# Patient Record
Sex: MALE | Race: ASIAN | NOT HISPANIC OR LATINO | URBAN - METROPOLITAN AREA
[De-identification: names, ages, dates, MRNs, and addresses within clinical notes are randomized per-mention and may not be internally consistent; named-entity substitution may affect disease eponyms.]

---

## 2020-06-24 ENCOUNTER — OUTPATIENT (OUTPATIENT)
Dept: OUTPATIENT SERVICES | Facility: HOSPITAL | Age: 45
LOS: 1 days | End: 2020-06-24

## 2021-03-16 ENCOUNTER — OUTPATIENT (OUTPATIENT)
Dept: OUTPATIENT SERVICES | Facility: HOSPITAL | Age: 46
LOS: 1 days | End: 2021-03-16

## 2021-06-28 ENCOUNTER — TRANSCRIPTION ENCOUNTER (OUTPATIENT)
Age: 46
End: 2021-06-28

## 2022-02-07 ENCOUNTER — TRANSCRIPTION ENCOUNTER (OUTPATIENT)
Age: 47
End: 2022-02-07

## 2022-02-28 ENCOUNTER — TRANSCRIPTION ENCOUNTER (OUTPATIENT)
Age: 47
End: 2022-02-28

## 2022-03-03 ENCOUNTER — EMERGENCY (EMERGENCY)
Facility: HOSPITAL | Age: 47
LOS: 1 days | Discharge: HOME | End: 2022-03-03
Admitting: EMERGENCY MEDICINE
Payer: COMMERCIAL

## 2022-03-03 PROCEDURE — 71045 X-RAY EXAM CHEST 1 VIEW: CPT | Mod: 26

## 2022-03-03 PROCEDURE — 99285 EMERGENCY DEPT VISIT HI MDM: CPT

## 2022-03-03 PROCEDURE — 76705 ECHO EXAM OF ABDOMEN: CPT | Mod: 26

## 2022-03-04 ENCOUNTER — INPATIENT (INPATIENT)
Facility: HOSPITAL | Age: 47
LOS: 3 days | Discharge: HOME | DRG: 871 | End: 2022-03-08
Attending: SURGERY | Admitting: SURGERY
Payer: COMMERCIAL

## 2022-03-04 VITALS
DIASTOLIC BLOOD PRESSURE: 60 MMHG | HEIGHT: 65 IN | TEMPERATURE: 100 F | RESPIRATION RATE: 18 BRPM | WEIGHT: 235.01 LBS | OXYGEN SATURATION: 97 % | HEART RATE: 137 BPM | SYSTOLIC BLOOD PRESSURE: 85 MMHG

## 2022-03-04 DIAGNOSIS — M54.9 DORSALGIA, UNSPECIFIED: ICD-10-CM

## 2022-03-04 DIAGNOSIS — Z90.49 ACQUIRED ABSENCE OF OTHER SPECIFIED PARTS OF DIGESTIVE TRACT: Chronic | ICD-10-CM

## 2022-03-04 DIAGNOSIS — I10 ESSENTIAL (PRIMARY) HYPERTENSION: ICD-10-CM

## 2022-03-04 DIAGNOSIS — K80.20 CALCULUS OF GALLBLADDER WITHOUT CHOLECYSTITIS WITHOUT OBSTRUCTION: ICD-10-CM

## 2022-03-04 DIAGNOSIS — K82.A2 PERFORATION OF GALLBLADDER IN CHOLECYSTITIS: ICD-10-CM

## 2022-03-04 DIAGNOSIS — R10.11 RIGHT UPPER QUADRANT PAIN: ICD-10-CM

## 2022-03-04 DIAGNOSIS — E11.9 TYPE 2 DIABETES MELLITUS WITHOUT COMPLICATIONS: ICD-10-CM

## 2022-03-04 DIAGNOSIS — Z90.49 ACQUIRED ABSENCE OF OTHER SPECIFIED PARTS OF DIGESTIVE TRACT: ICD-10-CM

## 2022-03-04 LAB
ALBUMIN SERPL ELPH-MCNC: 3.4 G/DL — SIGNIFICANT CHANGE UP (ref 3.3–5)
ALBUMIN SERPL ELPH-MCNC: 4.1 G/DL — SIGNIFICANT CHANGE UP (ref 3.3–5)
ALP SERPL-CCNC: 100 U/L — SIGNIFICANT CHANGE UP (ref 40–120)
ALP SERPL-CCNC: 97 U/L — SIGNIFICANT CHANGE UP (ref 40–120)
ALT FLD-CCNC: 44 U/L — SIGNIFICANT CHANGE UP (ref 10–45)
ALT FLD-CCNC: 46 U/L — HIGH (ref 10–45)
ANION GAP SERPL CALC-SCNC: 17 MMOL/L — SIGNIFICANT CHANGE UP (ref 5–17)
ANION GAP SERPL CALC-SCNC: 18 MMOL/L — HIGH (ref 5–17)
APTT BLD: 29.6 SEC — SIGNIFICANT CHANGE UP (ref 27.5–35.5)
APTT BLD: 32.4 SEC — SIGNIFICANT CHANGE UP (ref 27.5–35.5)
AST SERPL-CCNC: 44 U/L — HIGH (ref 10–40)
AST SERPL-CCNC: 56 U/L — HIGH (ref 10–40)
BASE EXCESS BLDV CALC-SCNC: 0.6 MMOL/L — SIGNIFICANT CHANGE UP (ref -2–2)
BASE EXCESS BLDV CALC-SCNC: 1.5 MMOL/L — SIGNIFICANT CHANGE UP (ref -2–2)
BASOPHILS # BLD AUTO: 0.01 K/UL — SIGNIFICANT CHANGE UP (ref 0–0.2)
BASOPHILS NFR BLD AUTO: 0.1 % — SIGNIFICANT CHANGE UP (ref 0–2)
BILIRUB DIRECT SERPL-MCNC: 1.4 MG/DL — HIGH (ref 0–0.3)
BILIRUB INDIRECT FLD-MCNC: 0.6 MG/DL — SIGNIFICANT CHANGE UP (ref 0.2–1)
BILIRUB SERPL-MCNC: 1.4 MG/DL — HIGH (ref 0.2–1.2)
BILIRUB SERPL-MCNC: 2 MG/DL — HIGH (ref 0.2–1.2)
BLD GP AB SCN SERPL QL: NEGATIVE — SIGNIFICANT CHANGE UP
BLOOD GAS VENOUS - CREATININE: SIGNIFICANT CHANGE UP MG/DL (ref 0.5–1.3)
BLOOD GAS VENOUS - CREATININE: SIGNIFICANT CHANGE UP MG/DL (ref 0.5–1.3)
BUN SERPL-MCNC: 18 MG/DL — SIGNIFICANT CHANGE UP (ref 7–23)
BUN SERPL-MCNC: 19 MG/DL — SIGNIFICANT CHANGE UP (ref 7–23)
CA-I SERPL-SCNC: 1.2 MMOL/L — SIGNIFICANT CHANGE UP (ref 1.15–1.33)
CA-I SERPL-SCNC: 1.24 MMOL/L — SIGNIFICANT CHANGE UP (ref 1.15–1.33)
CALCIUM SERPL-MCNC: 10.2 MG/DL — SIGNIFICANT CHANGE UP (ref 8.4–10.5)
CALCIUM SERPL-MCNC: 9.4 MG/DL — SIGNIFICANT CHANGE UP (ref 8.4–10.5)
CHLORIDE BLDV-SCNC: 97 MMOL/L — SIGNIFICANT CHANGE UP (ref 96–108)
CHLORIDE BLDV-SCNC: 97 MMOL/L — SIGNIFICANT CHANGE UP (ref 96–108)
CHLORIDE SERPL-SCNC: 95 MMOL/L — LOW (ref 96–108)
CHLORIDE SERPL-SCNC: 96 MMOL/L — SIGNIFICANT CHANGE UP (ref 96–108)
CO2 BLDV-SCNC: 27 MMOL/L — HIGH (ref 22–26)
CO2 BLDV-SCNC: 27 MMOL/L — HIGH (ref 22–26)
CO2 SERPL-SCNC: 21 MMOL/L — LOW (ref 22–31)
CO2 SERPL-SCNC: 22 MMOL/L — SIGNIFICANT CHANGE UP (ref 22–31)
CREAT SERPL-MCNC: 1.47 MG/DL — HIGH (ref 0.5–1.3)
CREAT SERPL-MCNC: 1.52 MG/DL — HIGH (ref 0.5–1.3)
EGFR: 56 ML/MIN/1.73M2 — LOW
EGFR: 58 ML/MIN/1.73M2 — LOW
EOSINOPHIL # BLD AUTO: 0.03 K/UL — SIGNIFICANT CHANGE UP (ref 0–0.5)
EOSINOPHIL NFR BLD AUTO: 0.3 % — SIGNIFICANT CHANGE UP (ref 0–6)
FLUAV AG NPH QL: SIGNIFICANT CHANGE UP
FLUBV AG NPH QL: SIGNIFICANT CHANGE UP
GAS PNL BLDV: 133 MMOL/L — LOW (ref 136–145)
GAS PNL BLDV: 134 MMOL/L — LOW (ref 136–145)
GAS PNL BLDV: SIGNIFICANT CHANGE UP
GLUCOSE BLDV-MCNC: 166 MG/DL — HIGH (ref 70–99)
GLUCOSE BLDV-MCNC: 99 MG/DL — SIGNIFICANT CHANGE UP (ref 70–99)
GLUCOSE SERPL-MCNC: 102 MG/DL — HIGH (ref 70–99)
GLUCOSE SERPL-MCNC: 158 MG/DL — HIGH (ref 70–99)
HCO3 BLDV-SCNC: 26 MMOL/L — SIGNIFICANT CHANGE UP (ref 22–29)
HCO3 BLDV-SCNC: 26 MMOL/L — SIGNIFICANT CHANGE UP (ref 22–29)
HCT VFR BLD CALC: 32.6 % — LOW (ref 39–50)
HCT VFR BLD CALC: 35.5 % — LOW (ref 39–50)
HCT VFR BLDA CALC: 34 % — LOW (ref 39–51)
HCT VFR BLDA CALC: 38 % — LOW (ref 39–51)
HGB BLD CALC-MCNC: 11.4 G/DL — LOW (ref 12.6–17.4)
HGB BLD CALC-MCNC: 12.5 G/DL — LOW (ref 12.6–17.4)
HGB BLD-MCNC: 10.9 G/DL — LOW (ref 13–17)
HGB BLD-MCNC: 11.9 G/DL — LOW (ref 13–17)
HOROWITZ INDEX BLDV+IHG-RTO: 21 — SIGNIFICANT CHANGE UP
IMM GRANULOCYTES NFR BLD AUTO: 0.5 % — SIGNIFICANT CHANGE UP (ref 0–1.5)
INR BLD: 1.41 RATIO — HIGH (ref 0.88–1.16)
INR BLD: 1.46 RATIO — HIGH (ref 0.88–1.16)
LACTATE BLDV-MCNC: 2.2 MMOL/L — HIGH (ref 0.7–2)
LACTATE BLDV-MCNC: 2.6 MMOL/L — HIGH (ref 0.7–2)
LACTATE SERPL-SCNC: 1.6 MMOL/L — SIGNIFICANT CHANGE UP (ref 0.7–2)
LYMPHOCYTES # BLD AUTO: 0.59 K/UL — LOW (ref 1–3.3)
LYMPHOCYTES # BLD AUTO: 5.9 % — LOW (ref 13–44)
MAGNESIUM SERPL-MCNC: 1.7 MG/DL — SIGNIFICANT CHANGE UP (ref 1.6–2.6)
MCHC RBC-ENTMCNC: 28.9 PG — SIGNIFICANT CHANGE UP (ref 27–34)
MCHC RBC-ENTMCNC: 29.2 PG — SIGNIFICANT CHANGE UP (ref 27–34)
MCHC RBC-ENTMCNC: 33.4 GM/DL — SIGNIFICANT CHANGE UP (ref 32–36)
MCHC RBC-ENTMCNC: 33.5 GM/DL — SIGNIFICANT CHANGE UP (ref 32–36)
MCV RBC AUTO: 86.5 FL — SIGNIFICANT CHANGE UP (ref 80–100)
MCV RBC AUTO: 87.2 FL — SIGNIFICANT CHANGE UP (ref 80–100)
MONOCYTES # BLD AUTO: 0.32 K/UL — SIGNIFICANT CHANGE UP (ref 0–0.9)
MONOCYTES NFR BLD AUTO: 3.2 % — SIGNIFICANT CHANGE UP (ref 2–14)
NEUTROPHILS # BLD AUTO: 9.05 K/UL — HIGH (ref 1.8–7.4)
NEUTROPHILS NFR BLD AUTO: 90 % — HIGH (ref 43–77)
NRBC # BLD: 0 /100 WBCS — SIGNIFICANT CHANGE UP (ref 0–0)
NRBC # BLD: 0 /100 WBCS — SIGNIFICANT CHANGE UP (ref 0–0)
PCO2 BLDV: 39 MMHG — LOW (ref 42–55)
PCO2 BLDV: 44 MMHG — SIGNIFICANT CHANGE UP (ref 42–55)
PH BLDV: 7.38 — SIGNIFICANT CHANGE UP (ref 7.32–7.43)
PH BLDV: 7.43 — SIGNIFICANT CHANGE UP (ref 7.32–7.43)
PHOSPHATE SERPL-MCNC: 2.6 MG/DL — SIGNIFICANT CHANGE UP (ref 2.5–4.5)
PLATELET # BLD AUTO: 241 K/UL — SIGNIFICANT CHANGE UP (ref 150–400)
PLATELET # BLD AUTO: 291 K/UL — SIGNIFICANT CHANGE UP (ref 150–400)
PO2 BLDV: 25 MMHG — SIGNIFICANT CHANGE UP (ref 25–45)
PO2 BLDV: 28 MMHG — SIGNIFICANT CHANGE UP (ref 25–45)
POTASSIUM BLDV-SCNC: 3.1 MMOL/L — LOW (ref 3.5–5.1)
POTASSIUM BLDV-SCNC: 3.3 MMOL/L — LOW (ref 3.5–5.1)
POTASSIUM SERPL-MCNC: 3.2 MMOL/L — LOW (ref 3.5–5.3)
POTASSIUM SERPL-MCNC: 3.5 MMOL/L — SIGNIFICANT CHANGE UP (ref 3.5–5.3)
POTASSIUM SERPL-SCNC: 3.2 MMOL/L — LOW (ref 3.5–5.3)
POTASSIUM SERPL-SCNC: 3.5 MMOL/L — SIGNIFICANT CHANGE UP (ref 3.5–5.3)
PROCALCITONIN SERPL-MCNC: 2.61 NG/ML — HIGH (ref 0.02–0.1)
PROT SERPL-MCNC: 7.2 G/DL — SIGNIFICANT CHANGE UP (ref 6–8.3)
PROT SERPL-MCNC: 8.4 G/DL — HIGH (ref 6–8.3)
PROTHROM AB SERPL-ACNC: 16.3 SEC — HIGH (ref 10.5–13.4)
PROTHROM AB SERPL-ACNC: 16.9 SEC — HIGH (ref 10.5–13.4)
RBC # BLD: 3.77 M/UL — LOW (ref 4.2–5.8)
RBC # BLD: 4.07 M/UL — LOW (ref 4.2–5.8)
RBC # FLD: 12.9 % — SIGNIFICANT CHANGE UP (ref 10.3–14.5)
RBC # FLD: 13 % — SIGNIFICANT CHANGE UP (ref 10.3–14.5)
RH IG SCN BLD-IMP: POSITIVE — SIGNIFICANT CHANGE UP
RSV RNA NPH QL NAA+NON-PROBE: SIGNIFICANT CHANGE UP
SAO2 % BLDV: 40.7 % — LOW (ref 67–88)
SAO2 % BLDV: 47.4 % — LOW (ref 67–88)
SARS-COV-2 RNA SPEC QL NAA+PROBE: SIGNIFICANT CHANGE UP
SODIUM SERPL-SCNC: 134 MMOL/L — LOW (ref 135–145)
SODIUM SERPL-SCNC: 135 MMOL/L — SIGNIFICANT CHANGE UP (ref 135–145)
WBC # BLD: 10.05 K/UL — SIGNIFICANT CHANGE UP (ref 3.8–10.5)
WBC # BLD: 2.26 K/UL — LOW (ref 3.8–10.5)
WBC # FLD AUTO: 10.05 K/UL — SIGNIFICANT CHANGE UP (ref 3.8–10.5)
WBC # FLD AUTO: 2.26 K/UL — LOW (ref 3.8–10.5)

## 2022-03-04 PROCEDURE — 99291 CRITICAL CARE FIRST HOUR: CPT

## 2022-03-04 PROCEDURE — 99292 CRITICAL CARE ADDL 30 MIN: CPT | Mod: 25

## 2022-03-04 PROCEDURE — 74177 CT ABD & PELVIS W/CONTRAST: CPT | Mod: 26

## 2022-03-04 PROCEDURE — 93010 ELECTROCARDIOGRAM REPORT: CPT

## 2022-03-04 PROCEDURE — 99222 1ST HOSP IP/OBS MODERATE 55: CPT

## 2022-03-04 PROCEDURE — 76705 ECHO EXAM OF ABDOMEN: CPT | Mod: 26,RT

## 2022-03-04 PROCEDURE — 99291 CRITICAL CARE FIRST HOUR: CPT | Mod: 25

## 2022-03-04 PROCEDURE — 71045 X-RAY EXAM CHEST 1 VIEW: CPT | Mod: 26

## 2022-03-04 RX ORDER — PHENYLEPHRINE HYDROCHLORIDE 10 MG/ML
0.5 INJECTION INTRAVENOUS
Qty: 40 | Refills: 0 | Status: DISCONTINUED | OUTPATIENT
Start: 2022-03-04 | End: 2022-03-05

## 2022-03-04 RX ORDER — PIPERACILLIN AND TAZOBACTAM 4; .5 G/20ML; G/20ML
3.38 INJECTION, POWDER, LYOPHILIZED, FOR SOLUTION INTRAVENOUS ONCE
Refills: 0 | Status: COMPLETED | OUTPATIENT
Start: 2022-03-04 | End: 2022-03-04

## 2022-03-04 RX ORDER — CHLORHEXIDINE GLUCONATE 213 G/1000ML
1 SOLUTION TOPICAL
Refills: 0 | Status: DISCONTINUED | OUTPATIENT
Start: 2022-03-04 | End: 2022-03-07

## 2022-03-04 RX ORDER — ACETAMINOPHEN 500 MG
1000 TABLET ORAL ONCE
Refills: 0 | Status: COMPLETED | OUTPATIENT
Start: 2022-03-04 | End: 2022-03-04

## 2022-03-04 RX ORDER — SODIUM CHLORIDE 9 MG/ML
3300 INJECTION, SOLUTION INTRAVENOUS ONCE
Refills: 0 | Status: COMPLETED | OUTPATIENT
Start: 2022-03-04 | End: 2022-03-04

## 2022-03-04 RX ORDER — INSULIN LISPRO 100/ML
VIAL (ML) SUBCUTANEOUS EVERY 6 HOURS
Refills: 0 | Status: DISCONTINUED | OUTPATIENT
Start: 2022-03-04 | End: 2022-03-05

## 2022-03-04 RX ORDER — SODIUM CHLORIDE 9 MG/ML
1000 INJECTION, SOLUTION INTRAVENOUS
Refills: 0 | Status: DISCONTINUED | OUTPATIENT
Start: 2022-03-04 | End: 2022-03-04

## 2022-03-04 RX ORDER — MAGNESIUM SULFATE 500 MG/ML
2 VIAL (ML) INJECTION ONCE
Refills: 0 | Status: COMPLETED | OUTPATIENT
Start: 2022-03-04 | End: 2022-03-04

## 2022-03-04 RX ORDER — SODIUM CHLORIDE 9 MG/ML
1000 INJECTION, SOLUTION INTRAVENOUS ONCE
Refills: 0 | Status: COMPLETED | OUTPATIENT
Start: 2022-03-04 | End: 2022-03-04

## 2022-03-04 RX ORDER — SODIUM CHLORIDE 9 MG/ML
1000 INJECTION, SOLUTION INTRAVENOUS
Refills: 0 | Status: DISCONTINUED | OUTPATIENT
Start: 2022-03-04 | End: 2022-03-06

## 2022-03-04 RX ORDER — ERTAPENEM SODIUM 1 G/1
1000 INJECTION, POWDER, LYOPHILIZED, FOR SOLUTION INTRAMUSCULAR; INTRAVENOUS ONCE
Refills: 0 | Status: COMPLETED | OUTPATIENT
Start: 2022-03-04 | End: 2022-03-04

## 2022-03-04 RX ORDER — POTASSIUM CHLORIDE 20 MEQ
40 PACKET (EA) ORAL ONCE
Refills: 0 | Status: COMPLETED | OUTPATIENT
Start: 2022-03-04 | End: 2022-03-04

## 2022-03-04 RX ORDER — PIPERACILLIN AND TAZOBACTAM 4; .5 G/20ML; G/20ML
3.38 INJECTION, POWDER, LYOPHILIZED, FOR SOLUTION INTRAVENOUS EVERY 8 HOURS
Refills: 0 | Status: DISCONTINUED | OUTPATIENT
Start: 2022-03-04 | End: 2022-03-05

## 2022-03-04 RX ADMIN — SODIUM CHLORIDE 1000 MILLILITER(S): 9 INJECTION, SOLUTION INTRAVENOUS at 21:30

## 2022-03-04 RX ADMIN — Medication 400 MILLIGRAM(S): at 18:56

## 2022-03-04 RX ADMIN — SODIUM CHLORIDE 3300 MILLILITER(S): 9 INJECTION, SOLUTION INTRAVENOUS at 15:39

## 2022-03-04 RX ADMIN — Medication 50 GRAM(S): at 17:24

## 2022-03-04 RX ADMIN — ERTAPENEM SODIUM 120 MILLIGRAM(S): 1 INJECTION, POWDER, LYOPHILIZED, FOR SOLUTION INTRAMUSCULAR; INTRAVENOUS at 19:07

## 2022-03-04 RX ADMIN — PIPERACILLIN AND TAZOBACTAM 200 GRAM(S): 4; .5 INJECTION, POWDER, LYOPHILIZED, FOR SOLUTION INTRAVENOUS at 15:38

## 2022-03-04 NOTE — ED PROVIDER NOTE - OBJECTIVE STATEMENT
47M w pmhx of DM, HTN, HLD presenting with CC of abdominal pain this morning. Patient had the same pain yesterday - evaluated at [facility] ER yesterday - US + for cholelithiasis and gallbladder sludge but no evidence of acute randy at that time. Pain was worse this morning accompanied by fever of 102F and diaphoresis. No n/v/d. +yellow/green soft stools. Still passing gas, past surgeries: appendectomy.

## 2022-03-04 NOTE — H&P ADULT - HISTORY OF PRESENT ILLNESS
47M w pmhx of DM, HTN, HLD, gout, sleep apnea (CPAP at night)  presenting with worsening RUQ. Pt states pain started 1 week ago, however at the time was mild and with no associated symptoms. He states that yesterday pain suddenly worsened and he noted greasy stools. He presented to [facility] ER yesterday - US + for cholelithiasis and gallbladder sludge but no evidence of acute randy at that time. Pt was discharged home, however this morning pain worsened as was associated fever of 104F and diaphoresis. He denies cp, sob, n/v, change in bowel habits, urinary symptoms, recent illness or sick contacts. He has never had this pain before. Surgical hx significant for open appendectomy.    In ED pt with tmax 100.3, SBP 80s to low 90s and tachycardia up to 130, resolved after 1L IVF. His labs are significant for no evidence of leukocytosis, ANKUSH (Cr 1.4) and lactate of 2.6. Additionally, T.bili elevated to 1.8 with mild transaminitis. A RUQ US was suggestive of perforated gallbladder with GBD dilated to 9mm.

## 2022-03-04 NOTE — ED PROVIDER NOTE - NS ED ROS FT
CONST: + fevers, no chills, no trauma  EYES: no pain, no blurry vision   ENT: no sore throat, no epistaxis, no rhinorrhea  CV: no chest pain, no palpitations, no orthopnea, no extremity pain or swelling  RESP: no shortness of breath, no cough, no sputum, no pleurisy, no wheezing  ABD: + abdominal pain, no nausea, no vomiting, no diarrhea, no black or bloody stool  : no dysuria, no hematuria, no frequency, no urgency  MSK: no back pain, no neck pain, no extremity pain  NEURO: no headache, no sensory disturbances, no focal weakness, no dizziness  HEME: no easy bleeding or bruising  SKIN: + diaphoresis, no rash

## 2022-03-04 NOTE — H&P ADULT - NSHPPHYSICALEXAM_GEN_ALL_CORE
PHYSICAL EXAM:  GENERAL: NAD  HEAD:  Atraumatic, Normocephalic  EYES: EOMI, PERRLA, conjunctiva and sclera clear  NECK: Supple  CHEST/LUNG:  Unlabored respirations  HEART: Regular rate and rhythm  ABDOMEN: Soft, Nontender, Nondistended. No rebound or guarding.   EXTREMITIES:  2+ Peripheral Pulses  NERVOUS SYSTEM:  Alert & Oriented X3

## 2022-03-04 NOTE — ED PROVIDER NOTE - PHYSICAL EXAMINATION
Const: Well-nourished, Well-developed, appearing stated age.  Eyes: no conjunctival injection, and symmetrical lids.  HEENT: Head NCAT, no lesions. Atraumatic external nose and ears.   Neck: Symmetric, trachea midline.   CVS: +S1/S2, Radial and DP pulses 2+ b/l.   RESP: Unlabored respiratory effort. Clear to auscultation bilaterally.  GI: Nontender/Nondistended, No CVA tenderness b/l.   MSK: Normocephalic/Atraumatic, Lower Extremities w/o edema b/l.   Skin: Warm, dry and intact.   Neuro: Fluent Speech. Motor & Sensation grossly intact.  Psych: Awake, Alert, & Oriented (AAO) x3. Appropriate mood and affect.

## 2022-03-04 NOTE — H&P ADULT - ATTENDING COMMENTS
ATTENDING ATTESTATION  I have seen and examined this patient with the resident housestaff. I have reviewed all labs, imaging and reports. I have participated in formulating the plan, and have read and agree with the history, ROS, exam, assessment and plan as stated above.     Dx: septic shock from acute perforated appendicitis.   SICU admission for resuscitation.   CT shows perforation into the GB, no free fluid or intra-abdominal collection.   Plan for abx, IR consultation for perc randy.     Total time spent in the care of this patient today (excluding critical care & procedures): 55 min                Over 50% of the total time was spent on counseling and coordination of care.     Radha Anderson M.D., M.S.  Division of Acute Care Surgery

## 2022-03-04 NOTE — CONSULT NOTE ADULT - SUBJECTIVE AND OBJECTIVE BOX
Vascular & Interventional Radiology    HPI: 47y Male with DM, HTN, HLD, gout, sleep apnea (CPAP at night)  presenting with worsening RUQ. Pt states pain started 1 week ago, however at the time was mild and with no associated symptoms. He states that yesterday pain suddenly worsened and he noted greasy stools. He presented to [facility] ER yesterday - US + for cholelithiasis and gallbladder sludge but no evidence of acute randy at that time. Pt was discharged home, however this morning pain worsened as was associated fever of 104F and diaphoresis. Surgical hx significant for open appendectomy. In ED pt with tmax 100.3, SBP 80s to low 90s and tachycardia up to 130, resolved after 1L IVF. Labs significant for ANKUSH (Cr 1.4) and lactate of 2.6. Additionally, T.bili elevated to 1.8 with mild transaminitis. A RUQ US and CT p ac/f perforated cholecystitis.    Allergies: NKDA    Data:  T(C): 36.9  HR: 105  BP: 90/51  RR: 23  SpO2: 98%    -WBC 2.26 / HgB 10.9 / Hct 32.6 / Plt 241  -Na 135 / Cl 95 / BUN 18 / Glucose 158  -K 3.5 / CO2 22 / Cr 1.47  -ALT 46 / Alk Phos 97 / T.Bili 1.4  -INR1.41    Imaging: reviewed and as in HPI.    Assessment:   47y Male w/ findings c/f perforated cholecystitis in the SICU on vasopressor support.    Plan:   - Will plan for percutaneous cholecystostomy tube placement 3/5. If patient deteriorates overnight, page IR for re-evaluation [phone].  - IV abx per SICU.  - AM labs including coags.  - Place IR procedure order under Dr. Wood.
SICU Consultation Note  =====================================================    HPI:  47M w pmhx of DM, HTN, HLD, gout, sleep apnea (CPAP at night)  presenting with worsening RUQ. Pt states pain started 1 week ago, however at the time was mild and with no associated symptoms. He states that yesterday pain suddenly worsened and he noted greasy stools. He presented to [facility] ER yesterday - US + for cholelithiasis and gallbladder sludge but no evidence of acute randy at that time. Pt was discharged home, however this morning pain worsened as was associated fever of 104F and diaphoresis. He denies cp, sob, n/v, change in bowel habits, urinary symptoms, recent illness or sick contacts. He has never had this pain before. Surgical hx significant for open appendectomy.    In ED pt with tmax 100.3, SBP 80s to low 90s and tachycardia up to 130, resolved after 1L IVF. His labs are significant for no evidence of leukocytosis, ANKUSH (Cr 1.4) and lactate of 2.6. Additionally, T.bili elevated to 1.8 with mild transaminitis. A RUQ US was suggestive of perforated gallbladder with GBD dilated to 9mm.    (04 Mar 2022 18:48)      Allergies:   PAST MEDICAL & SURGICAL HISTORY:  HTN (hypertension)    HLD (hyperlipidemia)    ROCHELLE (obstructive sleep apnea)    Diabetes mellitus    Gout    S/P appendectomy Open 1993      FAMILY HISTORY:  No pertinent family history in first degree relatives    SOCIAL HISTORY: denies tobacco, recreational drug use    ADVANCE DIRECTIVES: Presumed Full Code     REVIEW OF SYSTEMS:    Negative other than described in HPI    HOME MEDICATIONS:   Home Medications:      CURRENT MEDICATIONS:   --------------------------------------------------------------------------------------  Neurologic Medications    Respiratory Medications    Cardiovascular Medications  phenylephrine    Infusion 0.5 MICROgram(s)/kG/Min IV Continuous <Continuous>    Gastrointestinal Medications  multiple electrolytes Injection Type 1 1000 milliLiter(s) IV Continuous <Continuous>  multiple electrolytes Injection Type 1 Bolus 1000 milliLiter(s) IV Bolus once    Genitourinary Medications    Hematologic/Oncologic Medications    Antimicrobial/Immunologic Medications    Endocrine/Metabolic Medications  insulin lispro (ADMELOG) corrective regimen sliding scale   SubCutaneous every 6 hours    Topical/Other Medications  chlorhexidine 2% Cloths 1 Application(s) Topical <User Schedule>    --------------------------------------------------------------------------------------    VITAL SIGNS, INS/OUTS (last 24 hours):  --------------------------------------------------------------------------------------  T(C): 36.9 (03-04-22 @ 23:00), Max: 37.9 (03-04-22 @ 15:15)  HR: 105 (03-04-22 @ 23:00) (80 - 137)  BP: 90/51 (03-04-22 @ 23:00) (80/62 - 106/59)  BP(mean): 64 (03-04-22 @ 23:00) (63 - 76)  RR: 23 (03-04-22 @ 23:00) (18 - 23)  SpO2: 98% (03-04-22 @ 23:00) (97% - 100%)    CAPILLARY BLOOD GLUCOSE  POCT Blood Glucose.: 165 mg/dL (04 Mar 2022 15:36)      03-04 @ 07:01  -  03-04 @ 23:18  --------------------------------------------------------  IN:    multiple electrolytes Injection Type 1 Bolus: 1000 mL    Phenylephrine: 61 mL  Total IN: 1061 mL    OUT:    Voided (mL): 250 mL  Total OUT: 250 mL    Total NET: 811 mL    --------------------------------------------------------------------------------------    EXAM  NEUROLOGY  GCS:  15  Exam: Normal, NAD, alert, oriented x 3, no focal deficits.     HEENT  Exam: Normocephalic, atraumatic.  EOMI    RESPIRATORY  Exam: Lungs clear to auscultation, Normal expansion/effort.     CARDIOVASCULAR  Exam: S1, S2.  Regular rate and rhythm.      GI/NUTRITION  Exam: Abdomen soft, Non-tender, mildly distended  Current Diet: NPO    VASCULAR  Exam: Extremities warm, pink, well-perfused.     MUSCULOSKELETAL  Exam: All extremities moving spontaneously without limitations.     SKIN:  Exam: Good skin turgor, no skin breakdown.      Tubes/Lines/Drains   [x] Peripheral IV  [] Central Venous Line     	[] R	[] L	[] IJ	[] Fem	[] SC	Date Placed:   [] Arterial Line		[] R	[] L	[] Fem	[] Rad	[] Ax	Date Placed:   [] PICC:         	[] Midline		[] Mediport  [] Urinary Catheter		Date Placed:     METABOLIC/FLUIDS/ELECTROLYTES  multiple electrolytes Injection Type 1 1000 milliLiter(s) IV Continuous <Continuous>  multiple electrolytes Injection Type 1 Bolus 1000 milliLiter(s) IV Bolus once    HEMATOLOGIC  [x] DVT Prophylaxis:   Transfusions:	[] PRBC	[] Platelets		[] FFP	[] Cryoprecipitate    INFECTIOUS DISEASE  Antimicrobials/Immunologic Medications:      LABS  --------------------------------------------------------------------------------------  CBC (03-04 @ 16:04)                          11.9<L>                   10.05   )--------------(  291        90.0<H>% Neuts, 5.9<L>% Lymphs, ANC: 9.05<H>                          35.5<L>    BMP (03-04 @ 16:05)       135     |  95<L>   |  18    			Ca++ --      Ca 10.2         ---------------------------------( 158<H>		Mg 1.5<L>       3.5     |  22      |  1.47<H>			Ph --        LFTs (03-04 @ 16:05)      TPro 8.4<H> / Alb 4.1 / TBili 1.4<H> / DBili -- / AST 44<H> / ALT 46<H> / AlkPhos 97    Coags (03-04 @ 22:29)  aPTT 29.6 / INR 1.41<H> / PT 16.3<H>  Coags (03-04 @ 16:04)  aPTT 32.4 / INR 1.46<H> / PT 16.9<H>      ABG (03-04 @ 19:04)      /  /  /  /  / %     Lactate:  1.6    VBG (03-04 @ 22:25)     7.38 / 44 / 25 / 26 / 0.6 / 40.7<L>%      Lactate: 2.2<H>  VBG (03-04 @ 15:56)     7.43 / 39<L> / 28 / 26 / 1.5 / 47.4<L>%      Lactate: 2.6<H>    --------------------------------------------------------------------------------------    IMAGING RESULTS  < from: CT Abdomen and Pelvis w/ IV Cont (03.04.22 @ 20:23) >  INTERPRETATION:  cholelithiaisis with thickened gallbladder wall and mild   discontinuity of the lateral wall of the gallbladder adjacent to the   liver suggesting gallbladder perforation as seen on same day ultrasound.   f/u official report in the AM.    < end of copied text >  < from: US Abdomen Upper Quadrant Right (03.04.22 @ 16:57) >  IMPRESSION:    Findings consistent with perforated acute cholecystitis. Trace   pericholecystic fluid along the fossa. No discrete abscess is seen.    Prominent common bile duct measuring up to 9 mm.    Findings given to Dr. Warner by Dr. Tushar Roberson at 4:48 PM on 3/4/2022.    < end of copied text >

## 2022-03-04 NOTE — ED ADULT NURSE NOTE - OBJECTIVE STATEMENT
47 year old male presents to the ED through waiting room with spouse complaining of fever, RUQ abdominal pain, cough x 6 days. Tmax 104 at home today prior to arrival, took tylenol at 1300. Patient is A&Ox4, states he recently had UTI and finished abx. Patient denies any recurrent urinary symptoms. States abdominal pain radiates to back, denies chest pain. Patient denies SOB, dizziness, diarrhea, dysuria, hematuria, recent travel/sick contacts. 18g peripheral IV placed in R AC and 20g in L AC. Labs drawn and sent to lab. Blood cultures drawn and sent to lab. COVID swab sent to lab. EKG done and patient is on CM - Sinus Tach in 120s. Patient undressed and placed into gown, call bell in hand and side rails up with bed in lowest position for safety. blanket provided. Comfort and safety provided.

## 2022-03-04 NOTE — ED PROVIDER NOTE - ATTENDING CONTRIBUTION TO CARE
------------ATTENDING NOTE------------  pt w/ wife c/o constant increasing mild/moderate RUQ ache, worse w/ bending/twisting, slight nausea, decreased PO, fevers, similar but worse than past biliary colic, Code Sepsis on arrival, breathing comfortably, tachycardic but equal distal pulses, no GIB, no rash, empiric antibiotics, awaiting labs/imaging, close reassessments, Surgery consult now -->  - Nick Warner MD   ----------------------------------------------- ------------ATTENDING NOTE------------  pt w/ wife c/o constant increasing mild/moderate RUQ ache, worse w/ bending/twisting, slight nausea, decreased PO, fevers, similar but worse than past biliary colic, Code Sepsis on arrival, breathing comfortably, tachycardic but equal distal pulses, no GIB, no rash, empiric antibiotics, awaiting labs/imaging, close reassessments, Surgery consult now --> US w/ perforated GB, improved w/ IVF/fever reduction, awaiting Surgery for admission.  - Nick Warner MD   -----------------------------------------------

## 2022-03-04 NOTE — H&P ADULT - ASSESSMENT
47M w pmhx of DM, HTN, HLD, gout, sleep apnea (CPAP at night)  presenting with worsening RUQ pain. Findings suggestive of perforated gallbladder, r/o possible choledocholithiasis vs cholangitis.     - SICU consult for HD monitoring in the setting of sepsis   - Obtain blood cx   - IV ertapenem, IVF resuscitation  - Monitor abdominal exam   - Trend LFTs, WBC, lactate     - F/u CT a/p  - Discussed with Dr. Justin Casey PGY3   ATP   [id number]

## 2022-03-04 NOTE — ED PROVIDER NOTE - PROGRESS NOTE DETAILS
No free air under the diaphragm on CXR. Pt seen by surgical resident, awaiting call back for dispo. MAP currently 73, pt mentating well. Pain/nausea controlled. Sedrick Altman, EM PGY3

## 2022-03-04 NOTE — H&P ADULT - NSICDXPASTMEDICALHX_GEN_ALL_CORE_FT
PAST MEDICAL HISTORY:  Diabetes mellitus     Gout     HLD (hyperlipidemia)     HTN (hypertension)     ROCHELLE (obstructive sleep apnea)

## 2022-03-04 NOTE — H&P ADULT - NSHPLABSRESULTS_GEN_ALL_CORE
11.9   10.05 )-----------( 291      ( 04 Mar 2022 16:04 )             35.5       03-04    135  |  95<L>  |  18  ----------------------------<  158<H>  3.5   |  22  |  1.47<H>    Ca    10.2      04 Mar 2022 16:05  Mg     1.5     03-04    TPro  8.4<H>  /  Alb  4.1  /  TBili  1.4<H>  /  DBili  x   /  AST  44<H>  /  ALT  46<H>  /  AlkPhos  97  03-04                  PT/INR - ( 04 Mar 2022 16:04 )   PT: 16.9 sec;   INR: 1.46 ratio         PTT - ( 04 Mar 2022 16:04 )  PTT:32.4 sec          CAPILLARY BLOOD GLUCOSE      POCT Blood Glucose.: 165 mg/dL (04 Mar 2022 15:36)          RADIOLOGY:       ACC: [id number] EXAM:  US ABDOMEN RT UPR QUADRANT                          PROCEDURE DATE:  03/04/2022          INTERPRETATION:  Clinical indication:  Right upper quadrant pain.    Technique:  A right upper quadrant abdominal ultrasound was performed.    Comparison: Ultrasound 3/3/2022.    FINDINGS:    Liver: Increased echogenicity. Smooth hepatic contour.  Bile ducts: Common bile duct measures up to 9 mm with no filling defect,   previously up to 7 mm. No intrahepatic biliary ductal dilation.  Gallbladder: There is new discontinuity of the gallbladder wall along the   fundus with wall thickening, pericholecystic fluid, cholelithiasis and   gallbladder sludge. Surrounding amorphous fluid along the gallbladder   fossa is also seen.  Pancreas: Visualized portions are grossly normal.  Right kidney: 13.4 cm. No hydronephrosis.    The visualized portions of the IVC and Aorta are within normal limits.    IMPRESSION:    Findings consistent with perforated acute cholecystitis. Trace   pericholecystic fluid along the fossa. No discrete abscess is seen.    Prominent common bile duct measuring up to 9 mm.    Findings given to Dr. Warner by Dr. Tushar Roberson at 4:48 PM on 3/4/2022.

## 2022-03-04 NOTE — ED PROVIDER NOTE - CARE PLAN
Principal Discharge DX:	Acute cholecystitis  Secondary Diagnosis:	Acute sepsis  Secondary Diagnosis:	Moderate dehydration   1 Principal Discharge DX:	Acute cholecystitis  Secondary Diagnosis:	Acute sepsis  Secondary Diagnosis:	Moderate dehydration  Secondary Diagnosis:	Acute renal failure   Principal Discharge DX:	Cholecystitis with perforation of gallbladder  Secondary Diagnosis:	Acute sepsis  Secondary Diagnosis:	Moderate dehydration  Secondary Diagnosis:	Acute renal failure

## 2022-03-04 NOTE — CONSULT NOTE ADULT - ATTENDING COMMENTS
severe sepsis with septic hypotension requiring vasopressor for hypotension  source is contained perforation of gallbladder  in close contact with interventional radiology regarding possible need for emergent percutaneous drainage  fluid resuscitation based on bedside qualitative critical care echocardiography  I.V. antibiotics

## 2022-03-04 NOTE — ED ADULT NURSE REASSESSMENT NOTE - NS ED NURSE REASSESS COMMENT FT1
Received report from Wendy RODRIGUEZ. Patient resting comfortably in stretcher. A&Ox4. Patient in no acute distress. Patient pending bed assignment in SICU. Denies chest pain, SOB, headache, N/V/D, abdominal pain, fever/chills, burning upon urination or difficulty urinating currently. Plan of care discussed. Safety and comfort measures maintained.
Pt. back in [place] [facility] from . MD Foster at bedside discussing findings and plan of care. MD aware of BP (80/62). Pt. mentating well. States pain in abdomen is improved. IVF infusing as per MD orders.

## 2022-03-05 LAB
A1C WITH ESTIMATED AVERAGE GLUCOSE RESULT: 7.1 % — HIGH (ref 4–5.6)
ESTIMATED AVERAGE GLUCOSE: 157 MG/DL — HIGH (ref 68–114)
GAS PNL BLDA: SIGNIFICANT CHANGE UP
GLUCOSE BLDC GLUCOMTR-MCNC: 185 MG/DL — HIGH (ref 70–99)
GLUCOSE BLDC GLUCOMTR-MCNC: 215 MG/DL — HIGH (ref 70–99)
GLUCOSE BLDC GLUCOMTR-MCNC: 215 MG/DL — HIGH (ref 70–99)
GLUCOSE BLDC GLUCOMTR-MCNC: 250 MG/DL — HIGH (ref 70–99)
GP B STREP DNA BLD POS QL NAA+NON-PROBE: SIGNIFICANT CHANGE UP
GRAM STN FLD: SIGNIFICANT CHANGE UP
GRAM STN FLD: SIGNIFICANT CHANGE UP
LACTATE SERPL-SCNC: 2.7 MMOL/L — HIGH (ref 0.7–2)
METHOD TYPE: SIGNIFICANT CHANGE UP
SPECIMEN SOURCE: SIGNIFICANT CHANGE UP

## 2022-03-05 PROCEDURE — 47490 INCISION OF GALLBLADDER: CPT

## 2022-03-05 PROCEDURE — 71045 X-RAY EXAM CHEST 1 VIEW: CPT | Mod: 26

## 2022-03-05 PROCEDURE — 36556 INSERT NON-TUNNEL CV CATH: CPT

## 2022-03-05 PROCEDURE — 36620 INSERTION CATHETER ARTERY: CPT

## 2022-03-05 RX ORDER — ATORVASTATIN CALCIUM 80 MG/1
10 TABLET, FILM COATED ORAL AT BEDTIME
Refills: 0 | Status: DISCONTINUED | OUTPATIENT
Start: 2022-03-05 | End: 2022-03-08

## 2022-03-05 RX ORDER — MAGNESIUM SULFATE 500 MG/ML
2 VIAL (ML) INJECTION ONCE
Refills: 0 | Status: COMPLETED | OUTPATIENT
Start: 2022-03-05 | End: 2022-03-05

## 2022-03-05 RX ORDER — FENOFIBRATE,MICRONIZED 130 MG
145 CAPSULE ORAL DAILY
Refills: 0 | Status: DISCONTINUED | OUTPATIENT
Start: 2022-03-05 | End: 2022-03-08

## 2022-03-05 RX ORDER — ACETAMINOPHEN 500 MG
1000 TABLET ORAL EVERY 6 HOURS
Refills: 0 | Status: COMPLETED | OUTPATIENT
Start: 2022-03-05 | End: 2022-03-05

## 2022-03-05 RX ORDER — POTASSIUM PHOSPHATE, MONOBASIC POTASSIUM PHOSPHATE, DIBASIC 236; 224 MG/ML; MG/ML
30 INJECTION, SOLUTION INTRAVENOUS ONCE
Refills: 0 | Status: DISCONTINUED | OUTPATIENT
Start: 2022-03-05 | End: 2022-03-05

## 2022-03-05 RX ORDER — INSULIN LISPRO 100/ML
VIAL (ML) SUBCUTANEOUS
Refills: 0 | Status: DISCONTINUED | OUTPATIENT
Start: 2022-03-05 | End: 2022-03-08

## 2022-03-05 RX ORDER — ACETAMINOPHEN 500 MG
1000 TABLET ORAL EVERY 6 HOURS
Refills: 0 | Status: DISCONTINUED | OUTPATIENT
Start: 2022-03-05 | End: 2022-03-05

## 2022-03-05 RX ORDER — CALCIUM GLUCONATE 100 MG/ML
2 VIAL (ML) INTRAVENOUS ONCE
Refills: 0 | Status: COMPLETED | OUTPATIENT
Start: 2022-03-05 | End: 2022-03-05

## 2022-03-05 RX ORDER — ENOXAPARIN SODIUM 100 MG/ML
40 INJECTION SUBCUTANEOUS EVERY 24 HOURS
Refills: 0 | Status: DISCONTINUED | OUTPATIENT
Start: 2022-03-05 | End: 2022-03-08

## 2022-03-05 RX ORDER — INSULIN LISPRO 100/ML
VIAL (ML) SUBCUTANEOUS AT BEDTIME
Refills: 0 | Status: DISCONTINUED | OUTPATIENT
Start: 2022-03-05 | End: 2022-03-08

## 2022-03-05 RX ORDER — ACETAMINOPHEN 500 MG
1000 TABLET ORAL EVERY 6 HOURS
Refills: 0 | Status: DISCONTINUED | OUTPATIENT
Start: 2022-03-05 | End: 2022-03-06

## 2022-03-05 RX ORDER — SODIUM CHLORIDE 9 MG/ML
10 INJECTION INTRAMUSCULAR; INTRAVENOUS; SUBCUTANEOUS
Refills: 0 | Status: DISCONTINUED | OUTPATIENT
Start: 2022-03-05 | End: 2022-03-07

## 2022-03-05 RX ORDER — NOREPINEPHRINE BITARTRATE/D5W 8 MG/250ML
0.05 PLASTIC BAG, INJECTION (ML) INTRAVENOUS
Qty: 8 | Refills: 0 | Status: DISCONTINUED | OUTPATIENT
Start: 2022-03-05 | End: 2022-03-06

## 2022-03-05 RX ORDER — POTASSIUM CHLORIDE 20 MEQ
10 PACKET (EA) ORAL
Refills: 0 | Status: COMPLETED | OUTPATIENT
Start: 2022-03-05 | End: 2022-03-05

## 2022-03-05 RX ORDER — OXYCODONE HYDROCHLORIDE 5 MG/1
5 TABLET ORAL ONCE
Refills: 0 | Status: DISCONTINUED | OUTPATIENT
Start: 2022-03-05 | End: 2022-03-05

## 2022-03-05 RX ORDER — PIPERACILLIN AND TAZOBACTAM 4; .5 G/20ML; G/20ML
3.38 INJECTION, POWDER, LYOPHILIZED, FOR SOLUTION INTRAVENOUS EVERY 8 HOURS
Refills: 0 | Status: DISCONTINUED | OUTPATIENT
Start: 2022-03-05 | End: 2022-03-08

## 2022-03-05 RX ORDER — ALLOPURINOL 300 MG
300 TABLET ORAL DAILY
Refills: 0 | Status: DISCONTINUED | OUTPATIENT
Start: 2022-03-05 | End: 2022-03-08

## 2022-03-05 RX ORDER — VASOPRESSIN 20 [USP'U]/ML
0.03 INJECTION INTRAVENOUS
Qty: 50 | Refills: 0 | Status: DISCONTINUED | OUTPATIENT
Start: 2022-03-05 | End: 2022-03-06

## 2022-03-05 RX ORDER — VANCOMYCIN HCL 1 G
1000 VIAL (EA) INTRAVENOUS ONCE
Refills: 0 | Status: COMPLETED | OUTPATIENT
Start: 2022-03-05 | End: 2022-03-05

## 2022-03-05 RX ORDER — CHLORHEXIDINE GLUCONATE 213 G/1000ML
1 SOLUTION TOPICAL
Refills: 0 | Status: DISCONTINUED | OUTPATIENT
Start: 2022-03-05 | End: 2022-03-07

## 2022-03-05 RX ORDER — PHENYLEPHRINE HYDROCHLORIDE 10 MG/ML
1 INJECTION INTRAVENOUS
Qty: 40 | Refills: 0 | Status: DISCONTINUED | OUTPATIENT
Start: 2022-03-05 | End: 2022-03-05

## 2022-03-05 RX ADMIN — OXYCODONE HYDROCHLORIDE 5 MILLIGRAM(S): 5 TABLET ORAL at 14:40

## 2022-03-05 RX ADMIN — Medication 250 MILLIMOLE(S): at 04:26

## 2022-03-05 RX ADMIN — ENOXAPARIN SODIUM 40 MILLIGRAM(S): 100 INJECTION SUBCUTANEOUS at 11:02

## 2022-03-05 RX ADMIN — Medication 4: at 15:43

## 2022-03-05 RX ADMIN — Medication 4: at 07:28

## 2022-03-05 RX ADMIN — Medication 25 GRAM(S): at 01:01

## 2022-03-05 RX ADMIN — CHLORHEXIDINE GLUCONATE 1 APPLICATION(S): 213 SOLUTION TOPICAL at 07:27

## 2022-03-05 RX ADMIN — Medication 1000 MILLIGRAM(S): at 18:02

## 2022-03-05 RX ADMIN — Medication 400 MILLIGRAM(S): at 17:06

## 2022-03-05 RX ADMIN — Medication 300 MILLIGRAM(S): at 11:20

## 2022-03-05 RX ADMIN — Medication 100 MILLIEQUIVALENT(S): at 02:10

## 2022-03-05 RX ADMIN — PIPERACILLIN AND TAZOBACTAM 25 GRAM(S): 4; .5 INJECTION, POWDER, LYOPHILIZED, FOR SOLUTION INTRAVENOUS at 08:15

## 2022-03-05 RX ADMIN — Medication 9.99 MICROGRAM(S)/KG/MIN: at 07:57

## 2022-03-05 RX ADMIN — PIPERACILLIN AND TAZOBACTAM 25 GRAM(S): 4; .5 INJECTION, POWDER, LYOPHILIZED, FOR SOLUTION INTRAVENOUS at 17:06

## 2022-03-05 RX ADMIN — Medication 250 MILLIGRAM(S): at 18:49

## 2022-03-05 RX ADMIN — Medication 400 MILLIGRAM(S): at 02:30

## 2022-03-05 RX ADMIN — PHENYLEPHRINE HYDROCHLORIDE 20 MICROGRAM(S)/KG/MIN: 10 INJECTION INTRAVENOUS at 00:22

## 2022-03-05 RX ADMIN — VASOPRESSIN 1.8 UNIT(S)/MIN: 20 INJECTION INTRAVENOUS at 07:57

## 2022-03-05 RX ADMIN — Medication 400 MILLIGRAM(S): at 07:25

## 2022-03-05 RX ADMIN — SODIUM CHLORIDE 125 MILLILITER(S): 9 INJECTION, SOLUTION INTRAVENOUS at 04:30

## 2022-03-05 RX ADMIN — ATORVASTATIN CALCIUM 10 MILLIGRAM(S): 80 TABLET, FILM COATED ORAL at 21:34

## 2022-03-05 RX ADMIN — Medication 4: at 11:09

## 2022-03-05 RX ADMIN — Medication 1000 MILLIGRAM(S): at 11:38

## 2022-03-05 RX ADMIN — SODIUM CHLORIDE 125 MILLILITER(S): 9 INJECTION, SOLUTION INTRAVENOUS at 07:57

## 2022-03-05 RX ADMIN — Medication 9.99 MICROGRAM(S)/KG/MIN: at 04:30

## 2022-03-05 RX ADMIN — Medication 100 MILLIEQUIVALENT(S): at 04:25

## 2022-03-05 RX ADMIN — OXYCODONE HYDROCHLORIDE 5 MILLIGRAM(S): 5 TABLET ORAL at 14:10

## 2022-03-05 RX ADMIN — Medication 1000 MILLIGRAM(S): at 07:55

## 2022-03-05 RX ADMIN — Medication 9.99 MICROGRAM(S)/KG/MIN: at 02:10

## 2022-03-05 RX ADMIN — PIPERACILLIN AND TAZOBACTAM 200 GRAM(S): 4; .5 INJECTION, POWDER, LYOPHILIZED, FOR SOLUTION INTRAVENOUS at 00:22

## 2022-03-05 RX ADMIN — Medication 100 MILLIEQUIVALENT(S): at 01:01

## 2022-03-05 RX ADMIN — Medication 145 MILLIGRAM(S): at 11:20

## 2022-03-05 RX ADMIN — Medication 200 GRAM(S): at 08:16

## 2022-03-05 RX ADMIN — Medication 400 MILLIGRAM(S): at 11:20

## 2022-03-05 NOTE — PROVIDER CONTACT NOTE (OTHER) - ACTION/TREATMENT ORDERED:
Continue to administer phenylephrine via peripheral IV, continue to monitor. A-line and CVL to be placed if necessary pressor dose continues to increase. Nelson to be ordered and placed.

## 2022-03-05 NOTE — PATIENT PROFILE ADULT - FALL HARM RISK - HARM RISK INTERVENTIONS

## 2022-03-05 NOTE — PROGRESS NOTE ADULT - SUBJECTIVE AND OBJECTIVE BOX
SICU Daily Progress Note  =====================================================  24 Hour Interval/Overnight Events:      -     47M w pmhx of DM, HTN, HLD, gout, sleep apnea (CPAP at night)  presenting with worsening RUQ. Pt states pain started 1 week ago, however at the time was mild and with no associated symptoms. He states that yesterday pain suddenly worsened and he noted greasy stools. He presented to [facility] ER yesterday - US + for cholelithiasis and gallbladder sludge but no evidence of acute randy at that time. Pt was discharged home, however this morning pain worsened as was associated fever of 104F and diaphoresis. He denies cp, sob, n/v, change in bowel habits, urinary symptoms, recent illness or sick contacts. He has never had this pain before. Surgical hx significant for open appendectomy.    In ED pt with tmax 100.3, SBP 80s to low 90s and tachycardia up to 130, resolved after 1L IVF. His labs are significant for no evidence of leukocytosis, ANKUSH (Cr 1.4) and lactate of 2.6. Additionally, T.bili elevated to 1.8 with mild transaminitis. A RUQ US was suggestive of perforated gallbladder with GBD dilated to 9mm.       --------------------------------------------------------------------------------------  Allergies: No Known Allergies      MEDICATIONS:   --------------------------------------------------------------------------------------  Neurologic Medications  acetaminophen   IVPB .. 1000 milliGRAM(s) IV Intermittent every 6 hours  acetaminophen   IVPB .. 1000 milliGRAM(s) IV Intermittent every 6 hours    Respiratory Medications    Cardiovascular Medications  norepinephrine Infusion 0.05 MICROgram(s)/kG/Min IV Continuous <Continuous>  phenylephrine    Infusion 1 MICROgram(s)/kG/Min IV Continuous <Continuous>    Gastrointestinal Medications  multiple electrolytes Injection Type 1 1000 milliLiter(s) IV Continuous <Continuous>  sodium chloride 0.9% lock flush 10 milliLiter(s) IV Push every 1 hour PRN Pre/post blood products, medications, blood draw, and to maintain line patency    Genitourinary Medications    Hematologic/Oncologic Medications    Antimicrobial/Immunologic Medications  piperacillin/tazobactam IVPB.. 3.375 Gram(s) IV Intermittent every 8 hours    Endocrine/Metabolic Medications  insulin lispro (ADMELOG) corrective regimen sliding scale   SubCutaneous every 6 hours  vasopressin Infusion 0.001 Unit(s)/Min IV Continuous <Continuous>    Topical/Other Medications  chlorhexidine 2% Cloths 1 Application(s) Topical <User Schedule>  chlorhexidine 4% Liquid 1 Application(s) Topical <User Schedule>    --------------------------------------------------------------------------------------    VITAL SIGNS, INS/OUTS (last 24 hours):  --------------------------------------------------------------------------------------  T(C): 38.1 (03-05-22 @ 04:15), Max: 38.1 (03-05-22 @ 04:15)  HR: 80 (03-05-22 @ 04:30) (76 - 137)  BP: 91/50 (03-05-22 @ 03:01) (80/62 - 106/59)  BP(mean): 67 (03-05-22 @ 03:01) (62 - 76)  ABP: 112/60 (03-05-22 @ 04:30) (90/48 - 113/46)  ABP(mean): 79 (03-05-22 @ 04:30) (63 - 79)  RR: 25 (03-05-22 @ 04:30) (18 - 29)  SpO2: 99% (03-05-22 @ 04:30) (94% - 100%)  Wt(kg): --  CVP(mm Hg): --  CI: --  CAPILLARY BLOOD GLUCOSE      POCT Blood Glucose.: 165 mg/dL (04 Mar 2022 15:36)   N/A      03-04 @ 07:01  -  03-05 @ 04:48  --------------------------------------------------------  IN:    IV PiggyBack: 150 mL    IV PiggyBack: 300 mL    multiple electrolytes Injection Type 1 Bolus: 1000 mL    multiple electrolytes Injection Type 1.: 125 mL    Norepinephrine: 130 mL    Phenylephrine: 241 mL  Total IN: 1946 mL    OUT:    Ureteral Catheter (mL): 215 mL    Voided (mL): 250 mL  Total OUT: 465 mL    Total NET: 1481 mL        --------------------------------------------------------------------------------------    EXAM    NEUROLOGY  Exam: Normal, NAD, alert, oriented x3, no focal deficits.    HEENT  Exam: Normocephalic, atraumatic, EOMI.     RESPIRATORY  Exam: Lungs clear to auscultation, Normal expansion/effort.    CARDIOVASCULAR  Exam: S1, S2.  Regular rate and rhythm.      GI/NUTRITION  Exam: Abdomen soft, Non-tender, Non-distended.     METABOLIC/FLUIDS/ELECTROLYTES  multiple electrolytes Injection Type 1 1000 milliLiter(s) IV Continuous <Continuous>      HEMATOLOGIC  [x] VTE Prophylaxis:     LABS  --------------------------------------------------------------------------------------  CBC (03-04 @ 22:30)                          10.9<L>                   2.26<L>  )--------------(  241        --    % Neuts, --    % Lymphs, ANC: --                              32.6<L>  CBC (03-04 @ 16:04)                          11.9<L>                   10.05   )--------------(  291        90.0<H>% Neuts, 5.9<L>% Lymphs, ANC: 9.05<H>                          35.5<L>    BMP (03-04 @ 22:31)       134<L>  |  96      |  19    			Ca++ --      Ca 9.4          ---------------------------------( 102<H>		Mg 1.7          3.2<L>  |  21<L>   |  1.52<H>			Ph 2.6     BMP (03-04 @ 16:05)       135     |  95<L>   |  18    			Ca++ --      Ca 10.2         ---------------------------------( 158<H>		Mg 1.5<L>       3.5     |  22      |  1.47<H>			Ph --        LFTs (03-04 @ 22:31)      TPro 7.2 / Alb 3.4 / TBili 2.0<H> / DBili 1.4<H> / AST 56<H> / ALT 44 / AlkPhos 100  LFTs (03-04 @ 16:05)      TPro 8.4<H> / Alb 4.1 / TBili 1.4<H> / DBili -- / AST 44<H> / ALT 46<H> / AlkPhos 97    Coags (03-04 @ 22:29)  aPTT 29.6 / INR 1.41<H> / PT 16.3<H>  Coags (03-04 @ 16:04)  aPTT 32.4 / INR 1.46<H> / PT 16.9<H>      ABG (03-05 @ 01:57)      /  /  /  /  / %     Lactate:  2.7<H>  ABG (03-04 @ 19:04)      /  /  /  /  / %     Lactate:  1.6    VBG (03-04 @ 22:25)     7.38 / 44 / 25 / 26 / 0.6 / 40.7<L>%      Lactate: 2.2<H>  VBG (03-04 @ 15:56)     7.43 / 39<L> / 28 / 26 / 1.5 / 47.4<L>%      Lactate: 2.6<H>        -------------------------------------------------------------------------------------- SICU Daily Progress Note  =====================================================  24 Hour Interval/Overnight Events:      - POCUS Euvolemic  - S/P perchole  - Alejandro changed to Levo vaso due to increasing req  - Rosa R rad, Right IJ CVC  - 3L LR; 1Plasmalyte with IVF  - Ertapenem changed to Zosyn        47M w pmhx of DM, HTN, HLD, gout, sleep apnea (CPAP at night)  presenting with worsening RUQ. Pt states pain started 1 week ago, however at the time was mild and with no associated symptoms. He states that yesterday pain suddenly worsened and he noted greasy stools. He presented to [facility] ER yesterday - US + for cholelithiasis and gallbladder sludge but no evidence of acute randy at that time. Pt was discharged home, however this morning pain worsened as was associated fever of 104F and diaphoresis. He denies cp, sob, n/v, change in bowel habits, urinary symptoms, recent illness or sick contacts. He has never had this pain before. Surgical hx significant for open appendectomy.    In ED pt with tmax 100.3, SBP 80s to low 90s and tachycardia up to 130, resolved after 1L IVF. His labs are significant for no evidence of leukocytosis, ANKUSH (Cr 1.4) and lactate of 2.6. Additionally, T.bili elevated to 1.8 with mild transaminitis. A RUQ US was suggestive of perforated gallbladder with GBD dilated to 9mm.       --------------------------------------------------------------------------------------  Allergies: No Known Allergies      MEDICATIONS:   --------------------------------------------------------------------------------------  Neurologic Medications  acetaminophen   IVPB .. 1000 milliGRAM(s) IV Intermittent every 6 hours  acetaminophen   IVPB .. 1000 milliGRAM(s) IV Intermittent every 6 hours    Respiratory Medications    Cardiovascular Medications  norepinephrine Infusion 0.05 MICROgram(s)/kG/Min IV Continuous <Continuous>  phenylephrine    Infusion 1 MICROgram(s)/kG/Min IV Continuous <Continuous>    Gastrointestinal Medications  multiple electrolytes Injection Type 1 1000 milliLiter(s) IV Continuous <Continuous>  sodium chloride 0.9% lock flush 10 milliLiter(s) IV Push every 1 hour PRN Pre/post blood products, medications, blood draw, and to maintain line patency    Genitourinary Medications    Hematologic/Oncologic Medications    Antimicrobial/Immunologic Medications  piperacillin/tazobactam IVPB.. 3.375 Gram(s) IV Intermittent every 8 hours    Endocrine/Metabolic Medications  insulin lispro (ADMELOG) corrective regimen sliding scale   SubCutaneous every 6 hours  vasopressin Infusion 0.001 Unit(s)/Min IV Continuous <Continuous>    Topical/Other Medications  chlorhexidine 2% Cloths 1 Application(s) Topical <User Schedule>  chlorhexidine 4% Liquid 1 Application(s) Topical <User Schedule>    --------------------------------------------------------------------------------------    VITAL SIGNS, INS/OUTS (last 24 hours):  --------------------------------------------------------------------------------------  T(C): 38.1 (03-05-22 @ 04:15), Max: 38.1 (03-05-22 @ 04:15)  HR: 80 (03-05-22 @ 04:30) (76 - 137)  BP: 91/50 (03-05-22 @ 03:01) (80/62 - 106/59)  BP(mean): 67 (03-05-22 @ 03:01) (62 - 76)  ABP: 112/60 (03-05-22 @ 04:30) (90/48 - 113/46)  ABP(mean): 79 (03-05-22 @ 04:30) (63 - 79)  RR: 25 (03-05-22 @ 04:30) (18 - 29)  SpO2: 99% (03-05-22 @ 04:30) (94% - 100%)  Wt(kg): --  CVP(mm Hg): --  CI: --  CAPILLARY BLOOD GLUCOSE      POCT Blood Glucose.: 165 mg/dL (04 Mar 2022 15:36)   N/A      03-04 @ 07:01  -  03-05 @ 04:48  --------------------------------------------------------  IN:    IV PiggyBack: 150 mL    IV PiggyBack: 300 mL    multiple electrolytes Injection Type 1 Bolus: 1000 mL    multiple electrolytes Injection Type 1.: 125 mL    Norepinephrine: 130 mL    Phenylephrine: 241 mL  Total IN: 1946 mL    OUT:    Ureteral Catheter (mL): 215 mL    Voided (mL): 250 mL  Total OUT: 465 mL    Total NET: 1481 mL        --------------------------------------------------------------------------------------    EXAM    NEUROLOGY  Exam: Normal, NAD, alert, oriented x3, no focal deficits.    HEENT  Exam: Normocephalic, atraumatic, EOMI.     RESPIRATORY  Exam: Lungs clear to auscultation, Normal expansion/effort.    CARDIOVASCULAR  Exam: S1, S2.  Regular rate and rhythm.      GI/NUTRITION  Exam: Abdomen soft, Non-tender, Non-distended.     METABOLIC/FLUIDS/ELECTROLYTES  multiple electrolytes Injection Type 1 1000 milliLiter(s) IV Continuous <Continuous>      HEMATOLOGIC  [x] VTE Prophylaxis:     LABS  --------------------------------------------------------------------------------------  CBC (03-04 @ 22:30)                          10.9<L>                   2.26<L>  )--------------(  241        --    % Neuts, --    % Lymphs, ANC: --                              32.6<L>  CBC (03-04 @ 16:04)                          11.9<L>                   10.05   )--------------(  291        90.0<H>% Neuts, 5.9<L>% Lymphs, ANC: 9.05<H>                          35.5<L>    BMP (03-04 @ 22:31)       134<L>  |  96      |  19    			Ca++ --      Ca 9.4          ---------------------------------( 102<H>		Mg 1.7          3.2<L>  |  21<L>   |  1.52<H>			Ph 2.6     BMP (03-04 @ 16:05)       135     |  95<L>   |  18    			Ca++ --      Ca 10.2         ---------------------------------( 158<H>		Mg 1.5<L>       3.5     |  22      |  1.47<H>			Ph --        LFTs (03-04 @ 22:31)      TPro 7.2 / Alb 3.4 / TBili 2.0<H> / DBili 1.4<H> / AST 56<H> / ALT 44 / AlkPhos 100  LFTs (03-04 @ 16:05)      TPro 8.4<H> / Alb 4.1 / TBili 1.4<H> / DBili -- / AST 44<H> / ALT 46<H> / AlkPhos 97    Coags (03-04 @ 22:29)  aPTT 29.6 / INR 1.41<H> / PT 16.3<H>  Coags (03-04 @ 16:04)  aPTT 32.4 / INR 1.46<H> / PT 16.9<H>      ABG (03-05 @ 01:57)      /  /  /  /  / %     Lactate:  2.7<H>  ABG (03-04 @ 19:04)      /  /  /  /  / %     Lactate:  1.6    VBG (03-04 @ 22:25)     7.38 / 44 / 25 / 26 / 0.6 / 40.7<L>%      Lactate: 2.2<H>  VBG (03-04 @ 15:56)     7.43 / 39<L> / 28 / 26 / 1.5 / 47.4<L>%      Lactate: 2.6<H>        --------------------------------------------------------------------------------------

## 2022-03-05 NOTE — PROCEDURE NOTE - PLAN
Post-procedure Management of Cholecystostomy tube  Record output every 8 hours in the hospital and daily at home  If the catheter is to drainage, flush it gently with 5 to 10 cc of normal saline daily.   If there is leakage around the catheter, there is leakage or pain with flushing, or a significant change in output or position, please consult IR for evaluation    Removal Criteria  Removal requires formation of a tract between the gallbladder and skin to prevent leakage of bile into the peritoneum (usually occurs in 4-6 weeks)  Re-establishment of cystic duct patency.

## 2022-03-05 NOTE — PROGRESS NOTE ADULT - SUBJECTIVE AND OBJECTIVE BOX
Vascular & Interventional Radiology    Interval history: s/p perc randy 3/5 AM. Vitals and pressor requirements improved. Additional 30 mL output from drain since procedure.    Data:  T(C): 37.5  HR: 65  BP: 91/50  RR: 22  SpO2: 96%    -WBC 2.26 / HgB 10.9 / Hct 32.6 / Plt 241  -Na 134 / Cl 96 / BUN 19 / Glucose 102  -K 3.2 / CO2 21 / Cr 1.52  -ALT 44 / Alk Phos 100 / T.Bili 2.0  -INR1.41    Assessment:  47y Male w/ acute randy s/p perc randy 3/5.    Plan:   - Continue drain care and record output.  - Care per SICU. Page as needed [phone].

## 2022-03-05 NOTE — PROVIDER CONTACT NOTE (OTHER) - RECOMMENDATIONS
Place arterial line and CVL for pressor administration and titration. Place montes to mintor urine output & renal function q1h + continuous temperature monitoring.

## 2022-03-05 NOTE — PROVIDER CONTACT NOTE (OTHER) - SITUATION
Pt has increasing need for phenylephrine, is now at 1.6mcg/kg/min. Pt also has increasing temperature to 99F. Concern for worsening sepsis.

## 2022-03-05 NOTE — PROVIDER CONTACT NOTE (OTHER) - BACKGROUND
Pt admitted 3/4 for perforated gallbladder. Brought to SICU, phenylephrine started for hypotension. Pt has received abx.

## 2022-03-05 NOTE — PROGRESS NOTE ADULT - ASSESSMENT
47M w pmhx of DM, HTN, HLD, gout, sleep apnea (CPAP at night)  presenting with worsening RUQ pain. Findings suggestive of perforated gallbladder, r/o possible choledocholithiasis vs cholangitis.     Plan:  - Obtain blood cx   - IV ertapenem, IVF resuscitation  - Monitor abdominal exam   - Trend LFTs, WBC, lactate     - F/u CT a/p  - Excellent care per SICU  *INCOMPLETE NOTE*     ATP   [id number]  47M w pmhx of DM, HTN, HLD, gout, sleep apnea (CPAP at night)  presenting with worsening RUQ pain. Findings suggestive of perforated gallbladder now s/p IR per randy 3/5     Plan:  - IV ertapenem, IVF resuscitation  - Monitor abdominal exam   - Trend LFTs, WBC, lactate     - Excellent care per SICU      ATP   [id number]

## 2022-03-05 NOTE — PROVIDER CONTACT NOTE (OTHER) - ASSESSMENT
New IV placed to temporarily administer phenylephrine in forearm. Pt remains hypotensive w/ BP cuff cycling q5min.

## 2022-03-05 NOTE — PROCEDURE NOTE - PROCEDURE FINDINGS AND DETAILS
Initial US again demonstrated perforated cholecystitis. Successful perc randy and placement of 8.5 Fr drainage catheter. Left to gravity.

## 2022-03-05 NOTE — PROGRESS NOTE ADULT - ASSESSMENT
24  - POCUS Euvolemic  - Pressor support with Levo and Vaso  - underwent perc randy with drainage of purulent fluid  - 3L LR; 1Plasmalyte with IVF  - Ertapenem changed to Zosyn    47M w pmhx of DM, HTN, HLD, gout, sleep apnea (CPAP at night) with perforated acute cholecystitis. SICU consulted for sepsis management requiring pressor support.     Plan:  Neuro  - Pain control as needed: IV Tylenol    Respiratory: Sleep apnea (CPAP HS)  - IS  - Monitor Spo2  - CPAP overnight for ROCHELLE    Cardiovascular: Sepsis  - Monitor hemodynamic status and markers of perfusion  - Pressor support for hypotension with Alejandro; MAP goal over 65    GI: Acute cholecystitis with perforation   - Diet, NPO  - IR consulted to evaluate for Perchole tube      - Monitor and replete electrolytes as needed  - IV resuscitation with 2x LR bolus and 1L Plasmalyte  - IVF with plasmalyte @ 125  - Monitor I&O; making adequate urine  - Daily weights    Heme  - Monitor H&H; Transfuse as needed <7  - Holding DVT ppx given possible IR intervention    ID: Acute cholecystitis with perforation   - Given Ertapenem in ED x1, changed to Zosyn  - Trend WBC and monitor fever curve    Endo: DM  - Monitor glucose; maintain under 180 and avoid hypoglycemia  insulin lispro (ADMELOG) corrective regimen sliding scale   every 6 hours    Dispo: Continued SICU monitoring   47M w pmhx of DM, HTN, HLD, gout, sleep apnea (CPAP at night) with perforated acute cholecystitis. SICU consulted for sepsis management requiring pressor support.     Plan:  Neuro  - Pain control as needed: IV Tylenol and Toradol    Respiratory: Sleep apnea (CPAP HS)  - IS  - Monitor Spo2  - CPAP overnight for ROCHELLE    Cardiovascular: Sepsis  - Monitor hemodynamic status and markers of perfusion  - Pressor support for hypotension with Levo vaso; MAP goal over 65    GI: Acute cholecystitis with perforation   - Diet, NPO  - IR consulted to evaluate for Perchole tube; drained 50cc purulent material      - Monitor and replete electrolytes as needed  - IV resuscitation with 2x LR bolus and 1L Plasmalyte  - IVF with plasmalyte @ 125  - Monitor I&O; making adequate urine  - Daily weights    Heme  - Monitor H&H; Transfuse as needed <7  - Holding DVT ppx given possible IR intervention    ID: Acute cholecystitis with perforation   - Given Ertapenem in ED x1, changed to Zosyn  - Trend WBC and monitor fever curve  - F/U CX    Endo: DM  - Monitor glucose; maintain under 180 and avoid hypoglycemia  insulin lispro (ADMELOG) corrective regimen sliding scale   every 6 hours    Dispo: Continued SICU monitoring

## 2022-03-05 NOTE — PRE-ANESTHESIA EVALUATION ADULT - NSANTHADDINFOFT_GEN_ALL_CORE
R-IJ CVC & L radial [place] in situ  18g R AC PIV  on norepinephrine & vasopressin gtt  received zoysn at midnight  febrile 38.1C  active electrolyte repletion ongoing  IVF@125mL/hr

## 2022-03-05 NOTE — PROGRESS NOTE ADULT - SUBJECTIVE AND OBJECTIVE BOX
24h Events:  No acute events overnight.    Subjective:   Patient seen at bedside this AM.     Objective:  Vital Signs  T(C): 37.2 (03-05 @ 00:00), Max: 37.9 (03-04 @ 15:15)  HR: 109 (03-05 @ 00:45) (80 - 137)  BP: 92/50 (03-05 @ 00:45) (80/62 - 106/59)  RR: 27 (03-05 @ 00:45) (18 - 29)  SpO2: 96% (03-05 @ 00:45) (96% - 100%)  03-04-22 @ 07:01  -  03-05-22 @ 01:11  --------------------------------------------------------  IN:  Total IN: 0 mL    OUT:    Voided (mL): 250 mL  Total OUT: 250 mL    Total NET: -250 mL      PHYSICAL EXAM:  GENERAL: NAD  HEAD:  Atraumatic, Normocephalic  EYES: EOMI, PERRLA, conjunctiva and sclera clear  NECK: Supple  CHEST/LUNG:  Unlabored respirations  HEART: Regular rate and rhythm  ABDOMEN: Soft, Nontender, Nondistended. No rebound or guarding.   EXTREMITIES:  2+ Peripheral Pulses  NERVOUS SYSTEM:  Alert & Oriented X3    Labs:                        10.9   2.26  )-----------( 241      ( 04 Mar 2022 22:30 )             32.6   03-04    134<L>  |  96  |  19  ----------------------------<  102<H>  3.2<L>   |  21<L>  |  1.52<H>    Ca    9.4      04 Mar 2022 22:31  Phos  2.6     03-04  Mg     1.7     03-04    TPro  7.2  /  Alb  3.4  /  TBili  2.0<H>  /  DBili  1.4<H>  /  AST  56<H>  /  ALT  44  /  AlkPhos  100  03-04    CAPILLARY BLOOD GLUCOSE      POCT Blood Glucose.: 165 mg/dL (04 Mar 2022 15:36)      Imaging:     24h Events:  IR perc randy overnight.     Subjective:   Patient seen at bedside this AM.     Objective:  Vital Signs  T(C): 37.2 (03-05 @ 00:00), Max: 37.9 (03-04 @ 15:15)  HR: 109 (03-05 @ 00:45) (80 - 137)  BP: 92/50 (03-05 @ 00:45) (80/62 - 106/59)  RR: 27 (03-05 @ 00:45) (18 - 29)  SpO2: 96% (03-05 @ 00:45) (96% - 100%)  03-04-22 @ 07:01  -  03-05-22 @ 01:11  --------------------------------------------------------  IN:  Total IN: 0 mL    OUT:    Voided (mL): 250 mL  Total OUT: 250 mL    Total NET: -250 mL      PHYSICAL EXAM:  GENERAL: NAD  HEAD:  Atraumatic, Normocephalic  EYES: EOMI, PERRLA, conjunctiva and sclera clear  NECK: Supple  CHEST/LUNG:  Unlabored respirations  HEART: Regular rate and rhythm  ABDOMEN: Soft, Nontender, Nondistended. No rebound or guarding.   EXTREMITIES:  2+ Peripheral Pulses  NERVOUS SYSTEM:  Alert & Oriented X3    Labs:                        10.9   2.26  )-----------( 241      ( 04 Mar 2022 22:30 )             32.6   03-04    134<L>  |  96  |  19  ----------------------------<  102<H>  3.2<L>   |  21<L>  |  1.52<H>    Ca    9.4      04 Mar 2022 22:31  Phos  2.6     03-04  Mg     1.7     03-04    TPro  7.2  /  Alb  3.4  /  TBili  2.0<H>  /  DBili  1.4<H>  /  AST  56<H>  /  ALT  44  /  AlkPhos  100  03-04    CAPILLARY BLOOD GLUCOSE      POCT Blood Glucose.: 165 mg/dL (04 Mar 2022 15:36)      Imaging:

## 2022-03-05 NOTE — PROGRESS NOTE ADULT - SUBJECTIVE AND OBJECTIVE BOX
Interventional Radiology Pre-Procedure Note    This is a 47y Male acute cholecystitis, sepsis.    Procedure: Emergency per cholecystostomy    Diagnosis/Indication: Patient is a 47y old  Male who presents with a chief complaint of Sepsis (05 Mar 2022 01:10)    PAST MEDICAL & SURGICAL HISTORY:  HTN (hypertension)    HLD (hyperlipidemia)    ROCHELLE (obstructive sleep apnea)    Diabetes mellitus    Gout    S/P appendectomy  Open    Allergies: No Known Allergies    LABS:  CBC Full  -  ( 04 Mar 2022 22:30 )  WBC Count : 2.26 K/uL  RBC Count : 3.77 M/uL  Hemoglobin : 10.9 g/dL  Hematocrit : 32.6 %  Platelet Count - Automated : 241 K/uL  Mean Cell Volume : 86.5 fl  Mean Cell Hemoglobin : 28.9 pg  Mean Cell Hemoglobin Concentration : 33.4 gm/dL    03-04    134<L>  |  96  |  19  ----------------------------<  102<H>  3.2<L>   |  21<L>  |  1.52<H>    Ca    9.4      04 Mar 2022 22:31  Phos  2.6     03-04  Mg     1.7     03-04    TPro  7.2  /  Alb  3.4  /  TBili  2.0<H>  /  DBili  1.4<H>  /  AST  56<H>  /  ALT  44  /  AlkPhos  100  03-04    PT/INR - ( 04 Mar 2022 22:29 )   PT: 16.3 sec;   INR: 1.41 ratio      PTT - ( 04 Mar 2022 22:29 )  PTT:29.6 sec    Increasing pressor requirements, called to perform emergently    Procedure/ risks/ benefits/ alternatives were explained, informed consent obtained from patient, verbalizes understanding.     Plan: emergent perc cholecystostomy

## 2022-03-06 LAB
ALBUMIN SERPL ELPH-MCNC: 2.6 G/DL — LOW (ref 3.3–5)
ALBUMIN SERPL ELPH-MCNC: 2.6 G/DL — LOW (ref 3.3–5)
ALP SERPL-CCNC: 80 U/L — SIGNIFICANT CHANGE UP (ref 40–120)
ALP SERPL-CCNC: 88 U/L — SIGNIFICANT CHANGE UP (ref 40–120)
ALT FLD-CCNC: 75 U/L — HIGH (ref 10–45)
ALT FLD-CCNC: 85 U/L — HIGH (ref 10–45)
ANION GAP SERPL CALC-SCNC: 12 MMOL/L — SIGNIFICANT CHANGE UP (ref 5–17)
ANION GAP SERPL CALC-SCNC: 14 MMOL/L — SIGNIFICANT CHANGE UP (ref 5–17)
APTT BLD: 32.6 SEC — SIGNIFICANT CHANGE UP (ref 27.5–35.5)
AST SERPL-CCNC: 127 U/L — HIGH (ref 10–40)
AST SERPL-CCNC: 84 U/L — HIGH (ref 10–40)
BILIRUB SERPL-MCNC: 0.8 MG/DL — SIGNIFICANT CHANGE UP (ref 0.2–1.2)
BILIRUB SERPL-MCNC: 1.1 MG/DL — SIGNIFICANT CHANGE UP (ref 0.2–1.2)
BUN SERPL-MCNC: 18 MG/DL — SIGNIFICANT CHANGE UP (ref 7–23)
BUN SERPL-MCNC: 19 MG/DL — SIGNIFICANT CHANGE UP (ref 7–23)
CALCIUM SERPL-MCNC: 7.8 MG/DL — LOW (ref 8.4–10.5)
CALCIUM SERPL-MCNC: 8.2 MG/DL — LOW (ref 8.4–10.5)
CHLORIDE SERPL-SCNC: 100 MMOL/L — SIGNIFICANT CHANGE UP (ref 96–108)
CHLORIDE SERPL-SCNC: 98 MMOL/L — SIGNIFICANT CHANGE UP (ref 96–108)
CO2 SERPL-SCNC: 21 MMOL/L — LOW (ref 22–31)
CO2 SERPL-SCNC: 21 MMOL/L — LOW (ref 22–31)
CREAT SERPL-MCNC: 0.91 MG/DL — SIGNIFICANT CHANGE UP (ref 0.5–1.3)
CREAT SERPL-MCNC: 1.12 MG/DL — SIGNIFICANT CHANGE UP (ref 0.5–1.3)
EGFR: 104 ML/MIN/1.73M2 — SIGNIFICANT CHANGE UP
EGFR: 81 ML/MIN/1.73M2 — SIGNIFICANT CHANGE UP
GAS PNL BLDA: SIGNIFICANT CHANGE UP
GLUCOSE BLDC GLUCOMTR-MCNC: 122 MG/DL — HIGH (ref 70–99)
GLUCOSE BLDC GLUCOMTR-MCNC: 136 MG/DL — HIGH (ref 70–99)
GLUCOSE BLDC GLUCOMTR-MCNC: 196 MG/DL — HIGH (ref 70–99)
GLUCOSE BLDC GLUCOMTR-MCNC: 219 MG/DL — HIGH (ref 70–99)
GLUCOSE SERPL-MCNC: 157 MG/DL — HIGH (ref 70–99)
GLUCOSE SERPL-MCNC: 167 MG/DL — HIGH (ref 70–99)
HCT VFR BLD CALC: 27 % — LOW (ref 39–50)
HGB BLD-MCNC: 9 G/DL — LOW (ref 13–17)
INR BLD: 1.63 RATIO — HIGH (ref 0.88–1.16)
MAGNESIUM SERPL-MCNC: 1.9 MG/DL — SIGNIFICANT CHANGE UP (ref 1.6–2.6)
MAGNESIUM SERPL-MCNC: 2 MG/DL — SIGNIFICANT CHANGE UP (ref 1.6–2.6)
MCHC RBC-ENTMCNC: 29 PG — SIGNIFICANT CHANGE UP (ref 27–34)
MCHC RBC-ENTMCNC: 33.3 GM/DL — SIGNIFICANT CHANGE UP (ref 32–36)
MCV RBC AUTO: 87.1 FL — SIGNIFICANT CHANGE UP (ref 80–100)
MRSA PCR RESULT.: SIGNIFICANT CHANGE UP
NRBC # BLD: 0 /100 WBCS — SIGNIFICANT CHANGE UP (ref 0–0)
PHOSPHATE SERPL-MCNC: 1.2 MG/DL — LOW (ref 2.5–4.5)
PHOSPHATE SERPL-MCNC: 1.5 MG/DL — LOW (ref 2.5–4.5)
PLATELET # BLD AUTO: 206 K/UL — SIGNIFICANT CHANGE UP (ref 150–400)
POTASSIUM SERPL-MCNC: 3.3 MMOL/L — LOW (ref 3.5–5.3)
POTASSIUM SERPL-MCNC: 3.5 MMOL/L — SIGNIFICANT CHANGE UP (ref 3.5–5.3)
POTASSIUM SERPL-SCNC: 3.3 MMOL/L — LOW (ref 3.5–5.3)
POTASSIUM SERPL-SCNC: 3.5 MMOL/L — SIGNIFICANT CHANGE UP (ref 3.5–5.3)
PROT SERPL-MCNC: 5.9 G/DL — LOW (ref 6–8.3)
PROT SERPL-MCNC: 6.1 G/DL — SIGNIFICANT CHANGE UP (ref 6–8.3)
PROTHROM AB SERPL-ACNC: 19 SEC — HIGH (ref 10.5–13.4)
RBC # BLD: 3.1 M/UL — LOW (ref 4.2–5.8)
RBC # FLD: 13.6 % — SIGNIFICANT CHANGE UP (ref 10.3–14.5)
S AUREUS DNA NOSE QL NAA+PROBE: DETECTED
SODIUM SERPL-SCNC: 131 MMOL/L — LOW (ref 135–145)
SODIUM SERPL-SCNC: 135 MMOL/L — SIGNIFICANT CHANGE UP (ref 135–145)
WBC # BLD: 10.05 K/UL — SIGNIFICANT CHANGE UP (ref 3.8–10.5)
WBC # FLD AUTO: 10.05 K/UL — SIGNIFICANT CHANGE UP (ref 3.8–10.5)

## 2022-03-06 PROCEDURE — 99291 CRITICAL CARE FIRST HOUR: CPT

## 2022-03-06 RX ORDER — POTASSIUM CHLORIDE 20 MEQ
20 PACKET (EA) ORAL
Refills: 0 | Status: COMPLETED | OUTPATIENT
Start: 2022-03-06 | End: 2022-03-06

## 2022-03-06 RX ORDER — POTASSIUM PHOSPHATE, MONOBASIC POTASSIUM PHOSPHATE, DIBASIC 236; 224 MG/ML; MG/ML
30 INJECTION, SOLUTION INTRAVENOUS ONCE
Refills: 0 | Status: COMPLETED | OUTPATIENT
Start: 2022-03-06 | End: 2022-03-06

## 2022-03-06 RX ORDER — MAGNESIUM SULFATE 500 MG/ML
2 VIAL (ML) INJECTION ONCE
Refills: 0 | Status: COMPLETED | OUTPATIENT
Start: 2022-03-06 | End: 2022-03-06

## 2022-03-06 RX ORDER — ACETAMINOPHEN 500 MG
975 TABLET ORAL EVERY 6 HOURS
Refills: 0 | Status: DISCONTINUED | OUTPATIENT
Start: 2022-03-06 | End: 2022-03-08

## 2022-03-06 RX ORDER — ACETAMINOPHEN 500 MG
1000 TABLET ORAL ONCE
Refills: 0 | Status: COMPLETED | OUTPATIENT
Start: 2022-03-06 | End: 2022-03-06

## 2022-03-06 RX ADMIN — Medication 50 MILLIEQUIVALENT(S): at 16:07

## 2022-03-06 RX ADMIN — ENOXAPARIN SODIUM 40 MILLIGRAM(S): 100 INJECTION SUBCUTANEOUS at 11:49

## 2022-03-06 RX ADMIN — Medication 300 MILLIGRAM(S): at 11:49

## 2022-03-06 RX ADMIN — CHLORHEXIDINE GLUCONATE 1 APPLICATION(S): 213 SOLUTION TOPICAL at 05:30

## 2022-03-06 RX ADMIN — PIPERACILLIN AND TAZOBACTAM 25 GRAM(S): 4; .5 INJECTION, POWDER, LYOPHILIZED, FOR SOLUTION INTRAVENOUS at 08:19

## 2022-03-06 RX ADMIN — Medication 250 MILLIMOLE(S): at 15:05

## 2022-03-06 RX ADMIN — CHLORHEXIDINE GLUCONATE 1 APPLICATION(S): 213 SOLUTION TOPICAL at 05:31

## 2022-03-06 RX ADMIN — Medication 400 MILLIGRAM(S): at 02:49

## 2022-03-06 RX ADMIN — PIPERACILLIN AND TAZOBACTAM 25 GRAM(S): 4; .5 INJECTION, POWDER, LYOPHILIZED, FOR SOLUTION INTRAVENOUS at 16:08

## 2022-03-06 RX ADMIN — POTASSIUM PHOSPHATE, MONOBASIC POTASSIUM PHOSPHATE, DIBASIC 83.33 MILLIMOLE(S): 236; 224 INJECTION, SOLUTION INTRAVENOUS at 16:07

## 2022-03-06 RX ADMIN — Medication 250 MILLIMOLE(S): at 02:37

## 2022-03-06 RX ADMIN — Medication 145 MILLIGRAM(S): at 11:49

## 2022-03-06 RX ADMIN — Medication 100 MILLIEQUIVALENT(S): at 03:43

## 2022-03-06 RX ADMIN — VASOPRESSIN 1.8 UNIT(S)/MIN: 20 INJECTION INTRAVENOUS at 07:07

## 2022-03-06 RX ADMIN — ATORVASTATIN CALCIUM 10 MILLIGRAM(S): 80 TABLET, FILM COATED ORAL at 21:20

## 2022-03-06 RX ADMIN — Medication 2: at 11:53

## 2022-03-06 RX ADMIN — Medication 1000 MILLIGRAM(S): at 03:15

## 2022-03-06 RX ADMIN — Medication 250 MILLIMOLE(S): at 05:30

## 2022-03-06 RX ADMIN — Medication 9.99 MICROGRAM(S)/KG/MIN: at 07:07

## 2022-03-06 RX ADMIN — SODIUM CHLORIDE 125 MILLILITER(S): 9 INJECTION, SOLUTION INTRAVENOUS at 07:07

## 2022-03-06 RX ADMIN — Medication 4: at 08:00

## 2022-03-06 RX ADMIN — Medication 25 GRAM(S): at 15:05

## 2022-03-06 RX ADMIN — Medication 50 MILLIEQUIVALENT(S): at 17:58

## 2022-03-06 RX ADMIN — PIPERACILLIN AND TAZOBACTAM 25 GRAM(S): 4; .5 INJECTION, POWDER, LYOPHILIZED, FOR SOLUTION INTRAVENOUS at 01:04

## 2022-03-06 RX ADMIN — Medication 250 MILLIMOLE(S): at 06:53

## 2022-03-06 RX ADMIN — Medication 100 MILLIEQUIVALENT(S): at 05:30

## 2022-03-06 NOTE — PROGRESS NOTE ADULT - SUBJECTIVE AND OBJECTIVE BOX
SICU Daily Progress Note  =====================================================  24 Hour Interval/Overnight Events:      24  - POCUS Euvolemic  - S/P perchole  - GBS bacteremia  - Restarted home medications  - Pressor requirements downtrending    47M w pmhx of DM, HTN, HLD, gout, sleep apnea (CPAP at night)  presenting with worsening RUQ. Pt states pain started 1 week ago, however at the time was mild and with no associated symptoms. He states that yesterday pain suddenly worsened and he noted greasy stools. He presented to [facility] ER yesterday - US + for cholelithiasis and gallbladder sludge but no evidence of acute randy at that time. Pt was discharged home, however this morning pain worsened as was associated fever of 104F and diaphoresis. He denies cp, sob, n/v, change in bowel habits, urinary symptoms, recent illness or sick contacts. He has never had this pain before. Surgical hx significant for open appendectomy.    In ED pt with tmax 100.3, SBP 80s to low 90s and tachycardia up to 130, resolved after 1L IVF. His labs are significant for no evidence of leukocytosis, ANKUSH (Cr 1.4) and lactate of 2.6. Additionally, T.bili elevated to 1.8 with mild transaminitis. A RUQ US was suggestive of perforated gallbladder with GBD dilated to 9mm.       --------------------------------------------------------------------------------------  Allergies: No Known Allergies      MEDICATIONS:   --------------------------------------------------------------------------------------  Neurologic Medications  acetaminophen   IVPB .. 1000 milliGRAM(s) IV Intermittent every 6 hours    Respiratory Medications    Cardiovascular Medications  norepinephrine Infusion 0.05 MICROgram(s)/kG/Min IV Continuous <Continuous>    Gastrointestinal Medications  multiple electrolytes Injection Type 1 1000 milliLiter(s) IV Continuous <Continuous>  potassium chloride  20 mEq/100 mL IVPB 20 milliEquivalent(s) IV Intermittent every 1 hour  sodium chloride 0.9% lock flush 10 milliLiter(s) IV Push every 1 hour PRN Pre/post blood products, medications, blood draw, and to maintain line patency  sodium phosphate IVPB 15 milliMole(s) IV Intermittent every 1 hour    Genitourinary Medications    Hematologic/Oncologic Medications  enoxaparin Injectable 40 milliGRAM(s) SubCutaneous every 24 hours    Antimicrobial/Immunologic Medications  piperacillin/tazobactam IVPB.. 3.375 Gram(s) IV Intermittent every 8 hours    Endocrine/Metabolic Medications  allopurinol 300 milliGRAM(s) Oral daily  atorvastatin 10 milliGRAM(s) Oral at bedtime  fenofibrate Tablet 145 milliGRAM(s) Oral daily  insulin lispro (ADMELOG) corrective regimen sliding scale   SubCutaneous three times a day before meals  insulin lispro (ADMELOG) corrective regimen sliding scale   SubCutaneous at bedtime  vasopressin Infusion 0.03 Unit(s)/Min IV Continuous <Continuous>    Topical/Other Medications  chlorhexidine 2% Cloths 1 Application(s) Topical <User Schedule>  chlorhexidine 4% Liquid 1 Application(s) Topical <User Schedule>    --------------------------------------------------------------------------------------    VITAL SIGNS, INS/OUTS (last 24 hours):  --------------------------------------------------------------------------------------  T(C): 37.8 (03-06-22 @ 03:15), Max: 38.1 (03-05-22 @ 04:15)  HR: 68 (03-06-22 @ 04:00) (60 - 83)  BP: --  BP(mean): --  ABP: 101/57 (03-06-22 @ 04:00) (92/78 - 128/74)  ABP(mean): 73 (03-06-22 @ 04:00) (69 - 94)  RR: 22 (03-06-22 @ 04:00) (17 - 39)  SpO2: 98% (03-06-22 @ 04:00) (93% - 100%)  Wt(kg): --  CVP(mm Hg): --  CI: --  CAPILLARY BLOOD GLUCOSE      POCT Blood Glucose.: 185 mg/dL (05 Mar 2022 21:33)  POCT Blood Glucose.: 215 mg/dL (05 Mar 2022 15:41)  POCT Blood Glucose.: 215 mg/dL (05 Mar 2022 11:07)  POCT Blood Glucose.: 250 mg/dL (05 Mar 2022 07:09)   N/A      03-04 @ 07:01  -  03-05 @ 07:00  --------------------------------------------------------  IN:    IV PiggyBack: 650 mL    IV PiggyBack: 500 mL    multiple electrolytes Injection Type 1 Bolus: 1000 mL    multiple electrolytes Injection Type 1.: 500 mL    Norepinephrine: 255 mL    Phenylephrine: 241 mL    Vasopressin: 6.3 mL  Total IN: 3152.3 mL    OUT:    T-Tube (mL): 30 mL    Ureteral Catheter (mL): 410 mL    Voided (mL): 250 mL  Total OUT: 690 mL    Total NET: 2462.3 mL      03-05 @ 07:01  -  03-06 @ 04:11  --------------------------------------------------------  IN:    IV PiggyBack: 100 mL    IV PiggyBack: 650 mL    IV PiggyBack: 700 mL    multiple electrolytes Injection Type 1.: 2750 mL    Norepinephrine: 492 mL    Oral Fluid: 50 mL    Vasopressin: 39.6 mL  Total IN: 4781.6 mL    OUT:    T-Tube (mL): 70 mL    Ureteral Catheter (mL): 1500 mL  Total OUT: 1570 mL    Total NET: 3211.6 mL        --------------------------------------------------------------------------------------    EXAM  NEUROLOGY  Exam: Normal, NAD, alert, oriented x3, no focal deficits.     HEENT  Exam: Normocephalic, atraumatic, EOMI.     RESPIRATORY  Exam: Lungs clear to auscultation, Normal expansion/effort.     CARDIOVASCULAR  Exam: S1, S2.  Regular rate and rhythm.     GI/NUTRITION  Exam: Abdomen soft, Non-tender, mildly distended,  Percutaneous cholecystostomy tube in place with without surrounding erythema or purulent output.     METABOLIC/FLUIDS/ELECTROLYTES  multiple electrolytes Injection Type 1 1000 milliLiter(s) IV Continuous <Continuous>  potassium chloride  20 mEq/100 mL IVPB 20 milliEquivalent(s) IV Intermittent every 1 hour  sodium phosphate IVPB 15 milliMole(s) IV Intermittent every 1 hour      HEMATOLOGIC  [x] VTE Prophylaxis: enoxaparin Injectable 40 milliGRAM(s) SubCutaneous every 24 hours      LABS  --------------------------------------------------------------------------------------  CBC (03-06 @ 01:14)                          9.0<L>                   10.05   )--------------(  206        --    % Neuts, --    % Lymphs, ANC: --                              27.0<L>  CBC (03-04 @ 22:30)                          10.9<L>                   2.26<L>  )--------------(  241        --    % Neuts, --    % Lymphs, ANC: --                              32.6<L>    BMP (03-06 @ 01:14)       131<L>  |  98      |  19    			Ca++ --      Ca 8.2<L>       ---------------------------------( 157<H>		Mg 2.0          3.5     |  21<L>   |  1.12  			Ph 1.5<L>  BMP (03-04 @ 22:31)       134<L>  |  96      |  19    			Ca++ --      Ca 9.4          ---------------------------------( 102<H>		Mg 1.7          3.2<L>  |  21<L>   |  1.52<H>			Ph 2.6       LFTs (03-06 @ 01:14)      TPro 6.1 / Alb 2.6<L> / TBili 1.1 / DBili -- / <H> / ALT 85<H> / AlkPhos 80  LFTs (03-04 @ 22:31)      TPro 7.2 / Alb 3.4 / TBili 2.0<H> / DBili 1.4<H> / AST 56<H> / ALT 44 / AlkPhos 100    Coags (03-06 @ 01:14)  aPTT 32.6 / INR 1.63<H> / PT 19.0<H>  Coags (03-04 @ 22:29)  aPTT 29.6 / INR 1.41<H> / PT 16.3<H>      ABG (03-06 @ 01:04)     7.48<H> / 35 / 88 / 26 / 2.6 / 98.0%     Lactate:    ABG (03-05 @ 18:50)     7.45 / 34<L> / 103 / 24 / -0.1 / 99.2<H>%     Lactate:      VBG (03-04 @ 22:25)     7.38 / 44 / 25 / 26 / 0.6 / 40.7<L>%      Lactate: 2.2<H>      -> .Body Fluid Bile Fluid Culture (03-05 @ 10:09)       No polymorphonuclear leukocytes seen per low power field  Gram Positive Cocci in Pairs and Chains seen per oil power field  by cytocentrifuge    NG  NG    -> .Blood Blood-Peripheral Culture (03-04 @ 18:49)       Growth in aerobic bottle: Gram Positive Cocci in Pairs and Chains    Blood Culture PCR    No growth to date.      --------------------------------------------------------------------------------------

## 2022-03-06 NOTE — PROGRESS NOTE ADULT - ASSESSMENT
47M w pmhx of DM, HTN, HLD, gout, sleep apnea (CPAP at night)  presenting with worsening RUQ pain. Findings suggestive of perforated gallbladder now s/p IR per randy 3/5     Plan:  - IV ertapenem, IVF resuscitation  - Monitor abdominal exam   - wean pressors  - Trend LFTs, WBC, lactate     - Excellent care per SICU      ATP   [id number]

## 2022-03-06 NOTE — PROGRESS NOTE ADULT - SUBJECTIVE AND OBJECTIVE BOX
Subjective:   Patient seen and examined at bedside.     Objective:   Vital Signs Last 24 Hrs  T(C): 37.8 (06 Mar 2022 03:15), Max: 38.1 (05 Mar 2022 04:15)  T(F): 100 (06 Mar 2022 03:15), Max: 100.6 (05 Mar 2022 04:15)  HR: 66 (06 Mar 2022 03:38) (60 - 83)  BP: --  BP(mean): --  RR: 34 (06 Mar 2022 03:15) (17 - 39)  SpO2: 93% (06 Mar 2022 03:38) (93% - 100%)  I&O's Summary    04 Mar 2022 07:01  -  05 Mar 2022 07:00  --------------------------------------------------------  IN: 3152.3 mL / OUT: 690 mL / NET: 2462.3 mL    05 Mar 2022 07:01  -  06 Mar 2022 03:45  --------------------------------------------------------  IN: 4140.8 mL / OUT: 1525 mL / NET: 2615.8 mL        Physical Exam:  General: NAD, resting comfortably in bed  Pulmonary: Nonlabored breathing, no respiratory distress  Cardiovascular: NSR  Abdominal: soft, NT/ND  Extremities: WWP  Pulses:   Right:                                                                          Left:  FEM [ ]2+ [ ]1+ [ ]doppler                                             FEM [ ]2+ [ ]1+ [ ]doppler    POP [ ]2+ [ ]1+ [ ]doppler                                             POP [ ]2+ [ ]1+ [ ]doppler    DP [ ]2+ [ ]1+ [ ]doppler                                                DP [ ]2+ [ ]1+ [ ]doppler  PT[ ]2+ [ ]1+ [ ]doppler                                                  PT [ ]2+ [ ]1+ [ ]doppler      LABS:                        9.0    10.05 )-----------( 206      ( 06 Mar 2022 01:14 )             27.0     03-06    131<L>  |  98  |  19  ----------------------------<  157<H>  3.5   |  21<L>  |  1.12    Ca    8.2<L>      06 Mar 2022 01:14  Phos  1.5     03-06  Mg     2.0     03-06    TPro  6.1  /  Alb  2.6<L>  /  TBili  1.1  /  DBili  x   /  AST  127<H>  /  ALT  85<H>  /  AlkPhos  80  03-06    PT/INR - ( 06 Mar 2022 01:14 )   PT: 19.0 sec;   INR: 1.63 ratio         PTT - ( 06 Mar 2022 01:14 )  PTT:32.6 sec    piperacillin/tazobactam IVPB.. 3.375  enoxaparin Injectable 40  norepinephrine Infusion 0.05  piperacillin/tazobactam IVPB.. 3.375      Radiology and Additional Studies:    Assessment and Plan:  Subjective:   Patient seen and examined at bedside. states to be feeling better, had some crackers and water and tolerated it.     Objective:   Vital Signs Last 24 Hrs  T(C): 37.8 (06 Mar 2022 03:15), Max: 38.1 (05 Mar 2022 04:15)  T(F): 100 (06 Mar 2022 03:15), Max: 100.6 (05 Mar 2022 04:15)  HR: 66 (06 Mar 2022 03:38) (60 - 83)  BP: --  BP(mean): --  RR: 34 (06 Mar 2022 03:15) (17 - 39)  SpO2: 93% (06 Mar 2022 03:38) (93% - 100%)  I&O's Summary    04 Mar 2022 07:01  -  05 Mar 2022 07:00  --------------------------------------------------------  IN: 3152.3 mL / OUT: 690 mL / NET: 2462.3 mL    05 Mar 2022 07:01  -  06 Mar 2022 03:45  --------------------------------------------------------  IN: 4140.8 mL / OUT: 1525 mL / NET: 2615.8 mL        PHYSICAL EXAM:  GENERAL: NAD  HEAD:  Atraumatic, Normocephalic  EYES: EOMI, PERRLA, conjunctiva and sclera clear  NECK: Supple  CHEST/LUNG:  Unlabored respirations  HEART: Regular rate and rhythm  ABDOMEN: Soft, Nontender, Nondistended. No rebound or guarding.   EXTREMITIES:  2+ Peripheral Pulses  NERVOUS SYSTEM:  Alert & Oriented X3      LABS:                        9.0    10.05 )-----------( 206      ( 06 Mar 2022 01:14 )             27.0     03-06    131<L>  |  98  |  19  ----------------------------<  157<H>  3.5   |  21<L>  |  1.12    Ca    8.2<L>      06 Mar 2022 01:14  Phos  1.5     03-06  Mg     2.0     03-06    TPro  6.1  /  Alb  2.6<L>  /  TBili  1.1  /  DBili  x   /  AST  127<H>  /  ALT  85<H>  /  AlkPhos  80  03-06    PT/INR - ( 06 Mar 2022 01:14 )   PT: 19.0 sec;   INR: 1.63 ratio         PTT - ( 06 Mar 2022 01:14 )  PTT:32.6 sec    piperacillin/tazobactam IVPB.. 3.375  enoxaparin Injectable 40  norepinephrine Infusion 0.05  piperacillin/tazobactam IVPB.. 3.375      Radiology and Additional Studies:    Assessment and Plan:

## 2022-03-07 LAB
-  CEFTRIAXONE: SIGNIFICANT CHANGE UP
-  CLINDAMYCIN: SIGNIFICANT CHANGE UP
-  LEVOFLOXACIN: SIGNIFICANT CHANGE UP
-  PENICILLIN: SIGNIFICANT CHANGE UP
-  VANCOMYCIN: SIGNIFICANT CHANGE UP
ALBUMIN SERPL ELPH-MCNC: 2.6 G/DL — LOW (ref 3.3–5)
ALBUMIN SERPL ELPH-MCNC: 2.7 G/DL — LOW (ref 3.3–5)
ALP SERPL-CCNC: 144 U/L — HIGH (ref 40–120)
ALP SERPL-CCNC: 299 U/L — HIGH (ref 40–120)
ALT FLD-CCNC: 69 U/L — HIGH (ref 10–45)
ALT FLD-CCNC: 70 U/L — HIGH (ref 10–45)
ANION GAP SERPL CALC-SCNC: 12 MMOL/L — SIGNIFICANT CHANGE UP (ref 5–17)
APTT BLD: 34.7 SEC — SIGNIFICANT CHANGE UP (ref 27.5–35.5)
AST SERPL-CCNC: 67 U/L — HIGH (ref 10–40)
AST SERPL-CCNC: 77 U/L — HIGH (ref 10–40)
BILIRUB SERPL-MCNC: 0.6 MG/DL — SIGNIFICANT CHANGE UP (ref 0.2–1.2)
BILIRUB SERPL-MCNC: 0.8 MG/DL — SIGNIFICANT CHANGE UP (ref 0.2–1.2)
BUN SERPL-MCNC: 14 MG/DL — SIGNIFICANT CHANGE UP (ref 7–23)
BUN SERPL-MCNC: 16 MG/DL — SIGNIFICANT CHANGE UP (ref 7–23)
CALCIUM SERPL-MCNC: 8.1 MG/DL — LOW (ref 8.4–10.5)
CALCIUM SERPL-MCNC: 8.6 MG/DL — SIGNIFICANT CHANGE UP (ref 8.4–10.5)
CHLORIDE SERPL-SCNC: 105 MMOL/L — SIGNIFICANT CHANGE UP (ref 96–108)
CHLORIDE SERPL-SCNC: 107 MMOL/L — SIGNIFICANT CHANGE UP (ref 96–108)
CO2 SERPL-SCNC: 21 MMOL/L — LOW (ref 22–31)
CREAT SERPL-MCNC: 0.98 MG/DL — SIGNIFICANT CHANGE UP (ref 0.5–1.3)
CREAT SERPL-MCNC: 1.13 MG/DL — SIGNIFICANT CHANGE UP (ref 0.5–1.3)
CULTURE RESULTS: SIGNIFICANT CHANGE UP
EGFR: 80 ML/MIN/1.73M2 — SIGNIFICANT CHANGE UP
EGFR: 95 ML/MIN/1.73M2 — SIGNIFICANT CHANGE UP
GLUCOSE BLDC GLUCOMTR-MCNC: 105 MG/DL — HIGH (ref 70–99)
GLUCOSE BLDC GLUCOMTR-MCNC: 124 MG/DL — HIGH (ref 70–99)
GLUCOSE BLDC GLUCOMTR-MCNC: 141 MG/DL — HIGH (ref 70–99)
GLUCOSE BLDC GLUCOMTR-MCNC: 144 MG/DL — HIGH (ref 70–99)
GLUCOSE BLDC GLUCOMTR-MCNC: 167 MG/DL — HIGH (ref 70–99)
GLUCOSE SERPL-MCNC: 159 MG/DL — HIGH (ref 70–99)
GLUCOSE SERPL-MCNC: 164 MG/DL — HIGH (ref 70–99)
HCT VFR BLD CALC: 25.7 % — LOW (ref 39–50)
HCT VFR BLD CALC: 27.6 % — LOW (ref 39–50)
HGB BLD-MCNC: 8.6 G/DL — LOW (ref 13–17)
HGB BLD-MCNC: 9.2 G/DL — LOW (ref 13–17)
INR BLD: 1.34 RATIO — HIGH (ref 0.88–1.16)
MAGNESIUM SERPL-MCNC: 1.6 MG/DL — SIGNIFICANT CHANGE UP (ref 1.6–2.6)
MAGNESIUM SERPL-MCNC: 2.1 MG/DL — SIGNIFICANT CHANGE UP (ref 1.6–2.6)
MCHC RBC-ENTMCNC: 29.1 PG — SIGNIFICANT CHANGE UP (ref 27–34)
MCHC RBC-ENTMCNC: 29.5 PG — SIGNIFICANT CHANGE UP (ref 27–34)
MCHC RBC-ENTMCNC: 33.3 GM/DL — SIGNIFICANT CHANGE UP (ref 32–36)
MCHC RBC-ENTMCNC: 33.5 GM/DL — SIGNIFICANT CHANGE UP (ref 32–36)
MCV RBC AUTO: 86.8 FL — SIGNIFICANT CHANGE UP (ref 80–100)
MCV RBC AUTO: 88.5 FL — SIGNIFICANT CHANGE UP (ref 80–100)
METHOD TYPE: SIGNIFICANT CHANGE UP
METHOD TYPE: SIGNIFICANT CHANGE UP
NRBC # BLD: 0 /100 WBCS — SIGNIFICANT CHANGE UP (ref 0–0)
NRBC # BLD: 0 /100 WBCS — SIGNIFICANT CHANGE UP (ref 0–0)
ORGANISM # SPEC MICROSCOPIC CNT: SIGNIFICANT CHANGE UP
PHOSPHATE SERPL-MCNC: 2.1 MG/DL — LOW (ref 2.5–4.5)
PHOSPHATE SERPL-MCNC: 2.2 MG/DL — LOW (ref 2.5–4.5)
PLATELET # BLD AUTO: 219 K/UL — SIGNIFICANT CHANGE UP (ref 150–400)
PLATELET # BLD AUTO: 282 K/UL — SIGNIFICANT CHANGE UP (ref 150–400)
POTASSIUM SERPL-MCNC: 3.8 MMOL/L — SIGNIFICANT CHANGE UP (ref 3.5–5.3)
POTASSIUM SERPL-MCNC: 4.1 MMOL/L — SIGNIFICANT CHANGE UP (ref 3.5–5.3)
POTASSIUM SERPL-SCNC: 3.8 MMOL/L — SIGNIFICANT CHANGE UP (ref 3.5–5.3)
POTASSIUM SERPL-SCNC: 4.1 MMOL/L — SIGNIFICANT CHANGE UP (ref 3.5–5.3)
PROT SERPL-MCNC: 5.9 G/DL — LOW (ref 6–8.3)
PROT SERPL-MCNC: 6.3 G/DL — SIGNIFICANT CHANGE UP (ref 6–8.3)
PROTHROM AB SERPL-ACNC: 15.4 SEC — HIGH (ref 10.5–13.4)
RBC # BLD: 2.96 M/UL — LOW (ref 4.2–5.8)
RBC # BLD: 3.12 M/UL — LOW (ref 4.2–5.8)
RBC # FLD: 14.1 % — SIGNIFICANT CHANGE UP (ref 10.3–14.5)
RBC # FLD: 14.2 % — SIGNIFICANT CHANGE UP (ref 10.3–14.5)
SODIUM SERPL-SCNC: 138 MMOL/L — SIGNIFICANT CHANGE UP (ref 135–145)
SODIUM SERPL-SCNC: 138 MMOL/L — SIGNIFICANT CHANGE UP (ref 135–145)
SPECIMEN SOURCE: SIGNIFICANT CHANGE UP
WBC # BLD: 10.26 K/UL — SIGNIFICANT CHANGE UP (ref 3.8–10.5)
WBC # BLD: 8.83 K/UL — SIGNIFICANT CHANGE UP (ref 3.8–10.5)
WBC # FLD AUTO: 10.26 K/UL — SIGNIFICANT CHANGE UP (ref 3.8–10.5)
WBC # FLD AUTO: 8.83 K/UL — SIGNIFICANT CHANGE UP (ref 3.8–10.5)

## 2022-03-07 PROCEDURE — 99232 SBSQ HOSP IP/OBS MODERATE 35: CPT | Mod: GC

## 2022-03-07 PROCEDURE — 99231 SBSQ HOSP IP/OBS SF/LOW 25: CPT

## 2022-03-07 RX ORDER — POTASSIUM PHOSPHATE, MONOBASIC POTASSIUM PHOSPHATE, DIBASIC 236; 224 MG/ML; MG/ML
30 INJECTION, SOLUTION INTRAVENOUS ONCE
Refills: 0 | Status: COMPLETED | OUTPATIENT
Start: 2022-03-07 | End: 2022-03-07

## 2022-03-07 RX ORDER — CHLORHEXIDINE GLUCONATE 213 G/1000ML
1 SOLUTION TOPICAL
Refills: 0 | Status: DISCONTINUED | OUTPATIENT
Start: 2022-03-07 | End: 2022-03-08

## 2022-03-07 RX ORDER — POTASSIUM CHLORIDE 20 MEQ
10 PACKET (EA) ORAL ONCE
Refills: 0 | Status: COMPLETED | OUTPATIENT
Start: 2022-03-07 | End: 2022-03-07

## 2022-03-07 RX ORDER — POTASSIUM PHOSPHATE, MONOBASIC POTASSIUM PHOSPHATE, DIBASIC 236; 224 MG/ML; MG/ML
30 INJECTION, SOLUTION INTRAVENOUS ONCE
Refills: 0 | Status: COMPLETED | OUTPATIENT
Start: 2022-03-07 | End: 2022-03-08

## 2022-03-07 RX ORDER — MAGNESIUM SULFATE 500 MG/ML
2 VIAL (ML) INJECTION ONCE
Refills: 0 | Status: COMPLETED | OUTPATIENT
Start: 2022-03-07 | End: 2022-03-08

## 2022-03-07 RX ADMIN — ATORVASTATIN CALCIUM 10 MILLIGRAM(S): 80 TABLET, FILM COATED ORAL at 22:32

## 2022-03-07 RX ADMIN — Medication 10 MILLIEQUIVALENT(S): at 06:11

## 2022-03-07 RX ADMIN — PIPERACILLIN AND TAZOBACTAM 25 GRAM(S): 4; .5 INJECTION, POWDER, LYOPHILIZED, FOR SOLUTION INTRAVENOUS at 00:10

## 2022-03-07 RX ADMIN — PIPERACILLIN AND TAZOBACTAM 25 GRAM(S): 4; .5 INJECTION, POWDER, LYOPHILIZED, FOR SOLUTION INTRAVENOUS at 17:03

## 2022-03-07 RX ADMIN — Medication 300 MILLIGRAM(S): at 12:05

## 2022-03-07 RX ADMIN — CHLORHEXIDINE GLUCONATE 1 APPLICATION(S): 213 SOLUTION TOPICAL at 06:11

## 2022-03-07 RX ADMIN — ENOXAPARIN SODIUM 40 MILLIGRAM(S): 100 INJECTION SUBCUTANEOUS at 12:05

## 2022-03-07 RX ADMIN — POTASSIUM PHOSPHATE, MONOBASIC POTASSIUM PHOSPHATE, DIBASIC 83.33 MILLIMOLE(S): 236; 224 INJECTION, SOLUTION INTRAVENOUS at 04:10

## 2022-03-07 RX ADMIN — Medication 145 MILLIGRAM(S): at 12:06

## 2022-03-07 RX ADMIN — PIPERACILLIN AND TAZOBACTAM 25 GRAM(S): 4; .5 INJECTION, POWDER, LYOPHILIZED, FOR SOLUTION INTRAVENOUS at 09:32

## 2022-03-07 NOTE — PROGRESS NOTE ADULT - SUBJECTIVE AND OBJECTIVE BOX
Interventional Radiology Follow-Up Note    This is a 47y Male s/p percutaneous cholecystostomy on 3/5 in Interventional Radiology with Dr. Wood.     S: Patient seen and examined @ bedside. Denies abdominal pain, n/v. Off pressors.     Medication:  enoxaparin Injectable: (03-06)  piperacillin/tazobactam IVPB..: (03-07)  vancomycin  IVPB: (03-05)    Vitals:  T(F): 98.6, Max: 100.4 (18:00)  HR: 72  BP: 104/66  RR: 17  SpO2: 98%    Physical Exam:  General: Nontoxic, in NAD, A&O x3.  Abdomen: soft, NTND, no peritoneal signs.  Cholecystostomy Drain: Drain intact attached to bag with dark bile. Dressing clean, dry, intact. Drain flushed with 5cc NS w/o difficulty. +fluid return noted in tubing.     LABS:  WBC 8.83 / Hgb 8.6 / Hct 25.7 / Plt 219  Na 138 / K 3.8 / CO2 21 / Cl 105 / BUN 14 / Cr 0.98 / Glucose 159  ALT 69 / AST 67 / Alk Phos 144 / Tbili 0.8  Ptt 34.7 / Pt 15.4 / INR 1.34    Preliminary Report:  Numerous Streptococcus agalactiae (Group B) isolated    Group B streptococci are susceptible to ampicillin,    penicillin and cefazolin, but may be resistant to    erythromycin and clindamycin.    Recommendations for intrapartum prophylaxis for Group B    streptococci are penicillin or ampicillin.    24hr Drain output: 90cc    Assessment/Plan:  47y Male admitted with Cholecystitis with perforation of gallbladder s/p percutaneous cholecystostomy on 3/5 in Interventional Radiology with Dr. Wood.     -Drain functioning well  -abx per team  -monitor h/h; transfuse as needed  -trend vs/labs  -flush drain with 5cc NS daily forward only; DO NOT aspirate  -change dressing q3 days or when dressing is saturated  -regarding outpatient follow up with IR, if the patient is d/c home with drainage catheter they can make an appointment with IR by calling the IR booking office at [phone]; recommend IR follow in 6wks for tube evaluation.  -they will benefit from VNS service to help with drainage catheter care; they should continue same drainage catheter care as an outpatient.  -out pt f/u with surgery to determine candidacy for cholecystectomy.   -continue global management per primary team   -Will continue to follow.   -Please call IR at extension [phone] with any questions, concerns, or issues regarding above.      Ashley Arriaga PA-C  available on teams

## 2022-03-07 NOTE — PROGRESS NOTE ADULT - SUBJECTIVE AND OBJECTIVE BOX
Subjective:   Patient seen at bedside this AM. Pt feels well, denies signfiicant pain    Objective:  Vital Signs  T(C): 37 (03-07 @ 07:00), Max: 38 (03-06 @ 18:00)  HR: 77 (03-07 @ 09:00) (61 - 87)  BP: 110/72 (03-07 @ 09:00) (104/66 - 110/72)  RR: 25 (03-07 @ 09:00) (9 - 31)  SpO2: 99% (03-07 @ 09:00) (92% - 100%)  03-06-22 @ 07:01  -  03-07-22 @ 07:00  --------------------------------------------------------  IN:  Total IN: 0 mL    OUT:    Drain (mL): 90 mL    Ureteral Catheter (mL): 3790 mL  Total OUT: 3880 mL    Total NET: -3880 mL          Physical Exam:  GEN: resting in bed comfortably in NAD  RESP: no increased WOB  ABD: soft, nontender. IR drain with bilious output  EXTR: warm, well-perfused, no edema  NEURO: awake, alert    Labs:                        8.6    8.83  )-----------( 219      ( 07 Mar 2022 01:12 )             25.7   03-07    138  |  105  |  14  ----------------------------<  159<H>  3.8   |  21<L>  |  0.98    Ca    8.1<L>      07 Mar 2022 01:12  Phos  2.2     03-07  Mg     2.1     03-07    TPro  5.9<L>  /  Alb  2.6<L>  /  TBili  0.8  /  DBili  x   /  AST  67<H>  /  ALT  69<H>  /  AlkPhos  144<H>  03-07    CAPILLARY BLOOD GLUCOSE      POCT Blood Glucose.: 141 mg/dL (07 Mar 2022 07:56)  POCT Blood Glucose.: 136 mg/dL (06 Mar 2022 21:19)  POCT Blood Glucose.: 122 mg/dL (06 Mar 2022 16:10)  POCT Blood Glucose.: 196 mg/dL (06 Mar 2022 11:52)      Imaging:

## 2022-03-07 NOTE — PROGRESS NOTE ADULT - ASSESSMENT
47M w pmhx of DM, HTN, HLD, gout, sleep apnea (CPAP at night)  presenting with worsening RUQ pain. Findings suggestive of perforated gallbladder now s/p IR per randy 3/5.    Plan:  - Pt recovering well, has been off pressor support for ~24h.   - IV ertapenem  - Monitor abdominal exam   - Diet; Regular  - Excellent care per SICU    ATP   [id number]

## 2022-03-07 NOTE — PROGRESS NOTE ADULT - SUBJECTIVE AND OBJECTIVE BOX
HISTORY  47M w pmhx of DM, HTN, HLD, gout, sleep apnea (CPAP at night)  presenting with worsening RUQ. Pt states pain started 1 week ago, however at the time was mild and with no associated symptoms. He states that yesterday pain suddenly worsened and he noted greasy stools. He presented to [facility] ER yesterday - US + for cholelithiasis and gallbladder sludge but no evidence of acute randy at that time. Pt was discharged home, however this morning pain worsened as was associated fever of 104F and diaphoresis. He denies cp, sob, n/v, change in bowel habits, urinary symptoms, recent illness or sick contacts. He has never had this pain before. Surgical hx significant for open appendectomy.    In ED pt with tmax 100.3, SBP 80s to low 90s and tachycardia up to 130, resolved after 1L IVF. His labs are significant for no evidence of leukocytosis, ANKUSH (Cr 1.4) and lactate of 2.6. Additionally, T.bili elevated to 1.8 with mild transaminitis. A RUQ US was suggestive of perforated gallbladder with GBD dilated to 9mm.    24 HOUR EVENTS:  - Advanced to RD  - PO pain meds from IV  - Off pressor support as of 3/6, lactate now cleared      SUBJECTIVE/ROS:  A ten-point review of systems was otherwise negative except as noted.    NEURO  RASS:     GCS:     CAM ICU:  Exam: awake, alert, oriented x4, no acute distress, no acute focal deficits, lethargy, follows complex commands, follows simple commands, moving all four extremities, does not follow commands  Meds: acetaminophen     Tablet .. 975 milliGRAM(s) Oral every 6 hours PRN Mild Pain (1 - 3)      RESPIRATORY  RR: 23 (03-07-22 @ 01:00) (9 - 38)  SpO2: 95% (03-07-22 @ 01:00) (93% - 100%)  Wt(kg): --  Exam: clear to auscultation bilaterally coarse rhonchi in all lung fields  Mechanical Ventilation:   ABG - ( 06 Mar 2022 01:04 )  pH: 7.48  /  pCO2: 35    /  pO2: 88    / HCO3: 26    / Base Excess: 2.6   /  SaO2: 98.0    Lactate: x                Meds:     CARDIOVASCULAR  HR: 76 (03-07-22 @ 01:00) (56 - 87)  BP: --  BP(mean): --  ABP: 114/64 (03-07-22 @ 01:00) (93/50 - 134/73)  ABP(mean): 82 (03-07-22 @ 01:00) (63 - 117)  Wt(kg): --  CVP(cm H2O): --      Exam: regular rate and rhythm, regular rhythm, tachycardic, S1S2, irregularly irregular  Cardiac Rhythm: sinus sinus tachycardia atrial fibrillation  Perfusion     [ ]Adequate   [ ]Inadequate  Mentation   [ ]Normal       [ ]Reduced  Extremities  [ ]Warm         [ ]Cool  Volume Status [ ]Hypervolemic [ ]Euvolemic [ ]Hypovolemic  Meds:     GI/NUTRITION  Exam: soft, nontender, nondistended, softly distended, nikia-incisional tenderness, incision dressing C/D/I, NGT output, drain output  Diet:  Meds:     GENITOURINARY  I&O's Detail    03-05 @ 07:01  -  03-06 @ 07:00  --------------------------------------------------------  IN:    IV PiggyBack: 1050 mL    IV PiggyBack: 100 mL    IV PiggyBack: 650 mL    multiple electrolytes Injection Type 1.: 3000 mL    Norepinephrine: 528 mL    Oral Fluid: 50 mL    Vasopressin: 43.2 mL  Total IN: 5421.2 mL    OUT:    Drain (mL): 35 mL    T-Tube (mL): 70 mL    Ureteral Catheter (mL): 1640 mL  Total OUT: 1745 mL    Total NET: 3676.2 mL      03-06 @ 07:01  -  03-07 @ 01:22  --------------------------------------------------------  IN:    IV PiggyBack: 300 mL    IV PiggyBack: 125 mL    IV PiggyBack: 75 mL    IV PiggyBack: 812.3 mL    multiple electrolytes Injection Type 1.: 1000 mL    Norepinephrine: 32 mL    Vasopressin: 9 mL  Total IN: 2353.3 mL    OUT:    Drain (mL): 30 mL    Ureteral Catheter (mL): 2765 mL  Total OUT: 2795 mL    Total NET: -441.7 mL          03-06    135  |  100  |  18  ----------------------------<  167<H>  3.3<L>   |  21<L>  |  0.91    Ca    7.8<L>      06 Mar 2022 12:59  Phos  1.2     03-06  Mg     1.9     03-06    TPro  5.9<L>  /  Alb  2.6<L>  /  TBili  0.8  /  DBili  x   /  AST  84<H>  /  ALT  75<H>  /  AlkPhos  88  03-06    [ ] Nelson catheter, indication:   Meds: sodium chloride 0.9% lock flush 10 milliLiter(s) IV Push every 1 hour PRN Pre/post blood products, medications, blood draw, and to maintain line patency      HEMATOLOGIC  Meds: enoxaparin Injectable 40 milliGRAM(s) SubCutaneous every 24 hours    [ ] VTE Prophylaxis - held due to                         8.6    8.83  )-----------( 219      ( 07 Mar 2022 01:12 )             25.7     PT/INR - ( 06 Mar 2022 01:14 )   PT: 19.0 sec;   INR: 1.63 ratio         PTT - ( 06 Mar 2022 01:14 )  PTT:32.6 sec    INFECTIOUS DISEASES  T(C): 37.7 (03-06-22 @ 23:00), Max: 38 (03-06-22 @ 03:00)  Wt(kg): --  WBC Count: 8.83 K/uL (03-07 @ 01:12)    Recent Cultures:  Specimen Source: .Blood Blood-Peripheral, 03-06 @ 00:22; Results   No growth to date.; Gram Stain: --; Organism: --  Specimen Source: .Body Fluid Bile Fluid, 03-05 @ 10:09; Results   Numerous  Streptococcus agalactiae (Group B) isolated  Group B streptococci are susceptible to ampicillin,  penicillin and cefazolin, but may be resistant to  erythromycin and clindamycin.  Recommendations for intrapartum prophylaxis for Group B  streptococci are penicillin or ampicillin.; Gram Stain:   No polymorphonuclear leukocytes seen per low power field  Gram Positive Cocci in Pairs and Chains seen per oil power field  by cytocentrifuge; Organism: --  Specimen Source: .Blood Blood-Peripheral, 03-04 @ 18:49; Results   No growth to date.; Gram Stain:   Growth in aerobic bottle: Gram Positive Cocci in Pairs and Chains; Organism: Blood Culture PCR    Meds: piperacillin/tazobactam IVPB.. 3.375 Gram(s) IV Intermittent every 8 hours      ENDOCRINE  Capillary Blood Glucose    Meds: allopurinol 300 milliGRAM(s) Oral daily  atorvastatin 10 milliGRAM(s) Oral at bedtime  fenofibrate Tablet 145 milliGRAM(s) Oral daily  insulin lispro (ADMELOG) corrective regimen sliding scale   SubCutaneous three times a day before meals  insulin lispro (ADMELOG) corrective regimen sliding scale   SubCutaneous at bedtime      ACCESS DEVICES:  [ ] Peripheral IV  [ ] Central Venous Line	[ ] R	[ ] L	[ ] IJ	[ ] Fem	[ ] SC	Placed:   [ ] Arterial Line		[ ] R	[ ] L	[ ] Fem	[ ] Rad	[ ] Ax	Placed:   [ ] PICC:					[ ] Mediport  [ ] Urinary Catheter, Date Placed:   [ ] Necessity of urinary, arterial, and venous catheters discussed    OTHER MEDICATIONS:  chlorhexidine 2% Cloths 1 Application(s) Topical <User Schedule>  chlorhexidine 4% Liquid 1 Application(s) Topical <User Schedule>      IMAGING: HISTORY  47M w pmhx of DM, HTN, HLD, gout, sleep apnea (CPAP at night)  presenting with worsening RUQ. Pt states pain started 1 week ago, however at the time was mild and with no associated symptoms. He states that yesterday pain suddenly worsened and he noted greasy stools. He presented to [facility] ER yesterday - US + for cholelithiasis and gallbladder sludge but no evidence of acute randy at that time. Pt was discharged home, however this morning pain worsened as was associated fever of 104F and diaphoresis. He denies cp, sob, n/v, change in bowel habits, urinary symptoms, recent illness or sick contacts. He has never had this pain before. Surgical hx significant for open appendectomy.    In ED pt with tmax 100.3, SBP 80s to low 90s and tachycardia up to 130, resolved after 1L IVF. His labs are significant for no evidence of leukocytosis, ANKUSH (Cr 1.4) and lactate of 2.6. Additionally, T.bili elevated to 1.8 with mild transaminitis. A RUQ US was suggestive of perforated gallbladder with GBD dilated to 9mm.    24 HOUR EVENTS:  - Advanced to RD  - PO pain meds from IV  - Off pressor support as of 3/6, lactate now cleared      SUBJECTIVE/ROS:  A ten-point review of systems was otherwise negative except as noted.    NEURO  Exam: awake, alert, oriented x4, no acute distress  Meds: acetaminophen     Tablet .. 975 milliGRAM(s) Oral every 6 hours PRN Mild Pain (1 - 3)      RESPIRATORY  RR: 23 (03-07-22 @ 01:00) (9 - 38)  SpO2: 95% (03-07-22 @ 01:00) (93% - 100%)  Wt(kg): --  Exam: clear to auscultation bilaterally  Mechanical Ventilation:   ABG - ( 06 Mar 2022 01:04 )  pH: 7.48  /  pCO2: 35    /  pO2: 88    / HCO3: 26    / Base Excess: 2.6   /  SaO2: 98.0    Lactate: x                Meds:     CARDIOVASCULAR  HR: 76 (03-07-22 @ 01:00) (56 - 87)  BP: --  BP(mean): --  ABP: 114/64 (03-07-22 @ 01:00) (93/50 - 134/73)  ABP(mean): 82 (03-07-22 @ 01:00) (63 - 117)  Wt(kg): --  CVP(cm H2O): --      Exam: regular rate and rhythm  Cardiac Rhythm: sinus  Meds:     GI/NUTRITION  Exam: Abdomen soft, Non-tender, mildly distended,  Percutaneous cholecystostomy tube in place with without surrounding erythema or purulent output.  Diet:  Meds:    GENITOURINARY  I&O's Detail    03-05 @ 07:01  -  03-06 @ 07:00  --------------------------------------------------------  IN:    IV PiggyBack: 1050 mL    IV PiggyBack: 100 mL    IV PiggyBack: 650 mL    multiple electrolytes Injection Type 1.: 3000 mL    Norepinephrine: 528 mL    Oral Fluid: 50 mL    Vasopressin: 43.2 mL  Total IN: 5421.2 mL    OUT:    Drain (mL): 35 mL    T-Tube (mL): 70 mL    Ureteral Catheter (mL): 1640 mL  Total OUT: 1745 mL    Total NET: 3676.2 mL      03-06 @ 07:01  -  03-07 @ 01:22  --------------------------------------------------------  IN:    IV PiggyBack: 300 mL    IV PiggyBack: 125 mL    IV PiggyBack: 75 mL    IV PiggyBack: 812.3 mL    multiple electrolytes Injection Type 1.: 1000 mL    Norepinephrine: 32 mL    Vasopressin: 9 mL  Total IN: 2353.3 mL    OUT:    Drain (mL): 30 mL    Ureteral Catheter (mL): 2765 mL  Total OUT: 2795 mL    Total NET: -441.7 mL          03-06    135  |  100  |  18  ----------------------------<  167<H>  3.3<L>   |  21<L>  |  0.91    Ca    7.8<L>      06 Mar 2022 12:59  Phos  1.2     03-06  Mg     1.9     03-06    TPro  5.9<L>  /  Alb  2.6<L>  /  TBili  0.8  /  DBili  x   /  AST  84<H>  /  ALT  75<H>  /  AlkPhos  88  03-06    [ ] Nelson catheter, indication:   Meds: sodium chloride 0.9% lock flush 10 milliLiter(s) IV Push every 1 hour PRN Pre/post blood products, medications, blood draw, and to maintain line patency      HEMATOLOGIC  Meds: enoxaparin Injectable 40 milliGRAM(s) SubCutaneous every 24 hours    [ ] VTE Prophylaxis - held due to                         8.6    8.83  )-----------( 219      ( 07 Mar 2022 01:12 )             25.7     PT/INR - ( 06 Mar 2022 01:14 )   PT: 19.0 sec;   INR: 1.63 ratio         PTT - ( 06 Mar 2022 01:14 )  PTT:32.6 sec    INFECTIOUS DISEASES  T(C): 37.7 (03-06-22 @ 23:00), Max: 38 (03-06-22 @ 03:00)  Wt(kg): --  WBC Count: 8.83 K/uL (03-07 @ 01:12)    Recent Cultures:  Specimen Source: .Blood Blood-Peripheral, 03-06 @ 00:22; Results   No growth to date.; Gram Stain: --; Organism: --  Specimen Source: .Body Fluid Bile Fluid, 03-05 @ 10:09; Results   Numerous  Streptococcus agalactiae (Group B) isolated  Group B streptococci are susceptible to ampicillin,  penicillin and cefazolin, but may be resistant to  erythromycin and clindamycin.  Recommendations for intrapartum prophylaxis for Group B  streptococci are penicillin or ampicillin.; Gram Stain:   No polymorphonuclear leukocytes seen per low power field  Gram Positive Cocci in Pairs and Chains seen per oil power field  by cytocentrifuge; Organism: --  Specimen Source: .Blood Blood-Peripheral, 03-04 @ 18:49; Results   No growth to date.; Gram Stain:   Growth in aerobic bottle: Gram Positive Cocci in Pairs and Chains; Organism: Blood Culture PCR    Meds: piperacillin/tazobactam IVPB.. 3.375 Gram(s) IV Intermittent every 8 hours      ENDOCRINE  Capillary Blood Glucose    Meds: allopurinol 300 milliGRAM(s) Oral daily  atorvastatin 10 milliGRAM(s) Oral at bedtime  fenofibrate Tablet 145 milliGRAM(s) Oral daily  insulin lispro (ADMELOG) corrective regimen sliding scale   SubCutaneous three times a day before meals  insulin lispro (ADMELOG) corrective regimen sliding scale   SubCutaneous at bedtime    OTHER MEDICATIONS:  chlorhexidine 2% Cloths 1 Application(s) Topical <User Schedule>  chlorhexidine 4% Liquid 1 Application(s) Topical <User Schedule>      IMAGING:

## 2022-03-07 NOTE — PROGRESS NOTE ADULT - ATTENDING COMMENTS
Patient seen and examined on AM rounds and agree with above.   47 year old male with PMH signficant for DM, HTn, HLD,gout and ROCHELLE now with perforated cholecystitis s/p percutaneous cholecystostomy.   He clinically continues to improve.   Current management includes:  Neuro- pain is controlled with tylenol.   CV-Perfusion continues to improve. Hypotension improving and requiring less pressor support. Currently on levophed 0.04 mcg/kg/min and vasopressin which is down from yesterday. Will continue to wean medications as tolerated to MAP goal > 65 mmHg.   Pulm - oxygenation appropriate at this time.   ROCHELLE - continue nocturnal CPAP; his wife is to bring in his machine from home.      -goal SpO2 92%  GI- tolerating clears and will advance diet   Slight increase in transaminitis but will monitor closely.   Benign abdominal exam.   ID- Strep  agalectae bacteremia; Repeat cultures are pending.   -continue zosyn at this time   Endocrine - hyperglycemic likely secondary to infection given HBA1 c 7.1- will continue ISS with goal BG< 200    Overall sepsis is resolving and hemodynamics improving.  This patient was critically ill with one or more vital organ systems at a high probability of imminent or life threatening deterioration. Complex decision making was required to assess and treat single or multiple vital organ system failure and/or prevent further life threatening deterioration of the patient's condition.   CC time: 38 min
Dr. Lira (Attending Physician)  PO pain meds  septic shock resolved off pressors  ROCHELLE will order home cpap  perc randy with 30 mL output  -1.2 liters  on zosyn for cholecystitis, group b strep bacteremia will dw primary team regarding dc with oral abx vs. need for picc line

## 2022-03-07 NOTE — PROGRESS NOTE ADULT - ASSESSMENT
47M w pmhx of DM, HTN, HLD, gout, sleep apnea (CPAP at night) with perforated acute cholecystitis. SICU consulted for sepsis management requiring pressor support.     24 HOUR EVENTS:  - Advanced to RD  - PO pain meds from IV  - Off pressor support as of 3/6, lactate now cleared    Plan:  Neuro  - Pain control as needed: PO tylenol    Respiratory: Sleep apnea (CPAP HS)  - IS  - Monitor Spo2  - CPAP overnight for ROCHELLE  - Satting well on RA    Cardiovascular: Sepsis  - Monitor hemodynamic status and markers of perfusion  - Pressor support for hypotension with Levo Vaso; Now off  - Lactate cleared    GI: Acute cholecystitis with perforation s/p perc randy   - ccRD  - Trend LFTs  - Monitor gallbladder drain output      - Monitor and replete electrolytes as needed  - Monitor I&O; making adequate urine  - Will remove Nelson at 6AM on 3/7, follow up TOV    Heme  - Monitor H&H; Transfuse as needed <7  - LNX 40     ID: Acute cholecystitis with perforation   - Zosyn (3/5- )  - Trend WBC and monitor fever curve  - 3/4 BCx GBS, pending 3/5 BCx  - 3/5 bile cx GBS    Endo: DM  - Monitor glucose; maintain under 180 and avoid hypoglycemia  insulin lispro (ADMELOG) corrective regimen sliding scale AC/QHS    Dispo: Continued SICU monitoring

## 2022-03-08 ENCOUNTER — TRANSCRIPTION ENCOUNTER (OUTPATIENT)
Age: 47
End: 2022-03-08

## 2022-03-08 VITALS
TEMPERATURE: 98 F | DIASTOLIC BLOOD PRESSURE: 67 MMHG | OXYGEN SATURATION: 99 % | SYSTOLIC BLOOD PRESSURE: 118 MMHG | HEART RATE: 72 BPM | RESPIRATION RATE: 22 BRPM

## 2022-03-08 DIAGNOSIS — K82.A2 PERFORATION OF GALLBLADDER IN CHOLECYSTITIS: ICD-10-CM

## 2022-03-08 LAB
ANION GAP SERPL CALC-SCNC: 13 MMOL/L — SIGNIFICANT CHANGE UP (ref 5–17)
APTT BLD: 32.4 SEC — SIGNIFICANT CHANGE UP (ref 27.5–35.5)
CO2 SERPL-SCNC: 18 MMOL/L — LOW (ref 22–31)
GLUCOSE BLDC GLUCOMTR-MCNC: 135 MG/DL — HIGH (ref 70–99)
GLUCOSE BLDC GLUCOMTR-MCNC: 146 MG/DL — HIGH (ref 70–99)
INR BLD: 1.15 RATIO — SIGNIFICANT CHANGE UP (ref 0.88–1.16)
PROTHROM AB SERPL-ACNC: 13.4 SEC — SIGNIFICANT CHANGE UP (ref 10.5–13.4)

## 2022-03-08 PROCEDURE — 85025 COMPLETE CBC W/AUTO DIFF WBC: CPT

## 2022-03-08 PROCEDURE — 80048 BASIC METABOLIC PNL TOTAL CA: CPT

## 2022-03-08 PROCEDURE — 87040 BLOOD CULTURE FOR BACTERIA: CPT

## 2022-03-08 PROCEDURE — 80053 COMPREHEN METABOLIC PANEL: CPT

## 2022-03-08 PROCEDURE — 93005 ELECTROCARDIOGRAM TRACING: CPT

## 2022-03-08 PROCEDURE — 87181 SC STD AGAR DILUTION PER AGT: CPT

## 2022-03-08 PROCEDURE — 87641 MR-STAPH DNA AMP PROBE: CPT

## 2022-03-08 PROCEDURE — 71045 X-RAY EXAM CHEST 1 VIEW: CPT

## 2022-03-08 PROCEDURE — 83605 ASSAY OF LACTIC ACID: CPT

## 2022-03-08 PROCEDURE — 85018 HEMOGLOBIN: CPT

## 2022-03-08 PROCEDURE — 82435 ASSAY OF BLOOD CHLORIDE: CPT

## 2022-03-08 PROCEDURE — 86901 BLOOD TYPING SEROLOGIC RH(D): CPT

## 2022-03-08 PROCEDURE — 82947 ASSAY GLUCOSE BLOOD QUANT: CPT

## 2022-03-08 PROCEDURE — 94660 CPAP INITIATION&MGMT: CPT

## 2022-03-08 PROCEDURE — 99231 SBSQ HOSP IP/OBS SF/LOW 25: CPT

## 2022-03-08 PROCEDURE — 96375 TX/PRO/DX INJ NEW DRUG ADDON: CPT

## 2022-03-08 PROCEDURE — 84145 PROCALCITONIN (PCT): CPT

## 2022-03-08 PROCEDURE — 83690 ASSAY OF LIPASE: CPT

## 2022-03-08 PROCEDURE — 85730 THROMBOPLASTIN TIME PARTIAL: CPT

## 2022-03-08 PROCEDURE — 80076 HEPATIC FUNCTION PANEL: CPT

## 2022-03-08 PROCEDURE — C1769: CPT

## 2022-03-08 PROCEDURE — 87070 CULTURE OTHR SPECIMN AEROBIC: CPT

## 2022-03-08 PROCEDURE — C1729: CPT

## 2022-03-08 PROCEDURE — 82330 ASSAY OF CALCIUM: CPT

## 2022-03-08 PROCEDURE — 99292 CRITICAL CARE ADDL 30 MIN: CPT

## 2022-03-08 PROCEDURE — 99291 CRITICAL CARE FIRST HOUR: CPT

## 2022-03-08 PROCEDURE — 87184 SC STD DISK METHOD PER PLATE: CPT

## 2022-03-08 PROCEDURE — 83036 HEMOGLOBIN GLYCOSYLATED A1C: CPT

## 2022-03-08 PROCEDURE — 96374 THER/PROPH/DIAG INJ IV PUSH: CPT

## 2022-03-08 PROCEDURE — 86900 BLOOD TYPING SEROLOGIC ABO: CPT

## 2022-03-08 PROCEDURE — 82565 ASSAY OF CREATININE: CPT

## 2022-03-08 PROCEDURE — 47490 INCISION OF GALLBLADDER: CPT

## 2022-03-08 PROCEDURE — 85014 HEMATOCRIT: CPT

## 2022-03-08 PROCEDURE — 83735 ASSAY OF MAGNESIUM: CPT

## 2022-03-08 PROCEDURE — 87075 CULTR BACTERIA EXCEPT BLOOD: CPT

## 2022-03-08 PROCEDURE — 84132 ASSAY OF SERUM POTASSIUM: CPT

## 2022-03-08 PROCEDURE — 87077 CULTURE AEROBIC IDENTIFY: CPT

## 2022-03-08 PROCEDURE — 84100 ASSAY OF PHOSPHORUS: CPT

## 2022-03-08 PROCEDURE — 85610 PROTHROMBIN TIME: CPT

## 2022-03-08 PROCEDURE — 76705 ECHO EXAM OF ABDOMEN: CPT

## 2022-03-08 PROCEDURE — 82962 GLUCOSE BLOOD TEST: CPT

## 2022-03-08 PROCEDURE — 85027 COMPLETE CBC AUTOMATED: CPT

## 2022-03-08 PROCEDURE — 82803 BLOOD GASES ANY COMBINATION: CPT

## 2022-03-08 PROCEDURE — 87150 DNA/RNA AMPLIFIED PROBE: CPT

## 2022-03-08 PROCEDURE — 87640 STAPH A DNA AMP PROBE: CPT

## 2022-03-08 PROCEDURE — 87205 SMEAR GRAM STAIN: CPT

## 2022-03-08 PROCEDURE — 86850 RBC ANTIBODY SCREEN: CPT

## 2022-03-08 PROCEDURE — 36415 COLL VENOUS BLD VENIPUNCTURE: CPT

## 2022-03-08 PROCEDURE — 84295 ASSAY OF SERUM SODIUM: CPT

## 2022-03-08 PROCEDURE — 74177 CT ABD & PELVIS W/CONTRAST: CPT

## 2022-03-08 PROCEDURE — 87637 SARSCOV2&INF A&B&RSV AMP PRB: CPT

## 2022-03-08 RX ORDER — ACETAMINOPHEN 500 MG
3 TABLET ORAL
Qty: 0 | Refills: 0 | DISCHARGE
Start: 2022-03-08

## 2022-03-08 RX ADMIN — Medication 25 GRAM(S): at 00:13

## 2022-03-08 RX ADMIN — CHLORHEXIDINE GLUCONATE 1 APPLICATION(S): 213 SOLUTION TOPICAL at 06:47

## 2022-03-08 RX ADMIN — ENOXAPARIN SODIUM 40 MILLIGRAM(S): 100 INJECTION SUBCUTANEOUS at 11:26

## 2022-03-08 RX ADMIN — PIPERACILLIN AND TAZOBACTAM 25 GRAM(S): 4; .5 INJECTION, POWDER, LYOPHILIZED, FOR SOLUTION INTRAVENOUS at 08:24

## 2022-03-08 RX ADMIN — Medication 300 MILLIGRAM(S): at 11:25

## 2022-03-08 RX ADMIN — Medication 145 MILLIGRAM(S): at 11:25

## 2022-03-08 RX ADMIN — PIPERACILLIN AND TAZOBACTAM 25 GRAM(S): 4; .5 INJECTION, POWDER, LYOPHILIZED, FOR SOLUTION INTRAVENOUS at 00:13

## 2022-03-08 RX ADMIN — POTASSIUM PHOSPHATE, MONOBASIC POTASSIUM PHOSPHATE, DIBASIC 83.33 MILLIMOLE(S): 236; 224 INJECTION, SOLUTION INTRAVENOUS at 04:11

## 2022-03-08 NOTE — DISCHARGE NOTE NURSING/CASE MANAGEMENT/SOCIAL WORK - NSDCFUADDAPPT_GEN_ALL_CORE_FT
-Follow up with IR for your cholecystostomy drainage catheter they can make an appointment with IR by calling the IR booking office at [phone]  -Recommend IR follow in 6 weeks for tube evaluation.      -Follow up with acute care surgery at [address] [place] [address], [address] regarding eventual gallbladder removal (call [phone] for appointment.)

## 2022-03-08 NOTE — DISCHARGE NOTE PROVIDER - HOSPITAL COURSE
47M w/ PMH of DM, HTN, HLD, gout, sleep apnea (CPAP at night)  presenting with worsening RUQ. Pt states pain started 1 week ago, however at the time was mild and with no associated symptoms. He states that yesterday pain suddenly worsened and he noted greasy stools. He presented to [facility] ER yesterday - US + for cholelithiasis and gallbladder sludge but no evidence of acute randy at that time. Pt was discharged home, however this morning pain worsened as was associated fever of 104F and diaphoresis. He denies cp, sob, n/v, change in bowel habits, urinary symptoms, recent illness or sick contacts. He has never had this pain before. Surgical hx significant for open appendectomy.    In ED pt with tmax 100.3, SBP 80s to low 90s and tachycardia up to 130, resolved after 1L IVF. His labs are significant for no evidence of leukocytosis, ANKUSH (Cr 1.4) and lactate of 2.6. Additionally, T.bili elevated to 1.8 with mild transaminitis. A RUQ US was suggestive of perforated gallbladder with GBD dilated to 9mm.    s/p IR percutaneous cholecystostomy on 3/5

## 2022-03-08 NOTE — DISCHARGE NOTE PROVIDER - NSDCFUADDAPPT_GEN_ALL_CORE_FT
-Follow up with IR for your cholecystostomy drainage catheter they can make an appointment with IR by calling the IR booking office at [phone]  -Recommend IR follow in 6 weeks for tube evaluation. -Follow up with IR for your cholecystostomy drainage catheter they can make an appointment with IR by calling the IR booking office at [phone]  -Recommend IR follow in 6 weeks for tube evaluation.      -Follow up with acute care surgery at [address] [place] [address], [address] regarding eventual gallbladder removal (call [phone] for appointment.)

## 2022-03-08 NOTE — DISCHARGE NOTE PROVIDER - PROVIDER TOKENS
PROVIDER:[TOKEN:[[id number]]] PROVIDER:[TOKEN:[[id number]]],PROVIDER:[TOKEN:[[id number]],FOLLOWUP:[2 weeks]]

## 2022-03-08 NOTE — DISCHARGE NOTE NURSING/CASE MANAGEMENT/SOCIAL WORK - PATIENT PORTAL LINK FT
You can access the FollowMyHealth Patient Portal offered by [facility] by registering at the following website: [email or web address]. By joining [employer]’s FollowMyHealth portal, you will also be able to view your health information using other applications (apps) compatible with our system.

## 2022-03-08 NOTE — DISCHARGE NOTE PROVIDER - NSDCFUADDINST_GEN_ALL_CORE_FT
Your blood pressure medications were held while you were in te hospital. Please continue to hold them (amlodipine and ramipril) until your follow up visit when your blood pressure is rechecked. If you take your blood pressure at home and it is elevated, you can also resume them at that time. Your blood pressure medications were held while you were in the hospital. Please continue to hold them (amlodipine and ramipril) until your follow up visit when your blood pressure is rechecked. If you check your blood pressure at home and it is elevated, you can also resume them at that time.

## 2022-03-08 NOTE — DISCHARGE NOTE PROVIDER - NSDCMRMEDTOKEN_GEN_ALL_CORE_FT
allopurinol 300 mg oral tablet: 1 tab(s) orally once a day  amLODIPine 5 mg oral tablet: 1 tab(s) orally once a day  atorvastatin 10 mg oral tablet: 1 tab(s) orally once a day  fenofibrate 145 mg oral tablet: 1 tab(s) orally once a day  hydroCHLOROthiazide 25 mg oral tablet: 1 tab(s) orally once a day  metFORMIN 1000 mg oral tablet: 1 tab(s) orally 2 times a day  ramipril 10 mg oral tablet: 1 tab(s) orally once a day  Trulicity Pen 1.5 mg/0.5 mL subcutaneous solution: 1 stick(s) subcutaneous once a week   acetaminophen 325 mg oral tablet: 3 tab(s) orally every 6 hours, As needed, Mild Pain (1 - 3)  allopurinol 300 mg oral tablet: 1 tab(s) orally once a day  amLODIPine 5 mg oral tablet: 1 tab(s) orally once a day  amoxicillin-clavulanate 875 mg-125 mg oral tablet: 1 tab(s) orally every 12 hours   atorvastatin 10 mg oral tablet: 1 tab(s) orally once a day  fenofibrate 145 mg oral tablet: 1 tab(s) orally once a day  hydroCHLOROthiazide 25 mg oral tablet: 1 tab(s) orally once a day  metFORMIN 1000 mg oral tablet: 1 tab(s) orally 2 times a day  ramipril 10 mg oral tablet: 1 tab(s) orally once a day  Trulicity Pen 1.5 mg/0.5 mL subcutaneous solution: 1 stick(s) subcutaneous once a week

## 2022-03-08 NOTE — DISCHARGE NOTE PROVIDER - CARE PROVIDERS DIRECT ADDRESSES
,[email or web address]

## 2022-03-08 NOTE — DISCHARGE NOTE PROVIDER - NSDCCPTREATMENT_GEN_ALL_CORE_FT
PRINCIPAL PROCEDURE  Procedure: Cholecystotomy  Findings and Treatment: You had a percutaneous cholecystostomy on 3/5 in Interventional Radiology with Dr. Wood.  Monitor site for any sign or symptoms of infection (painful red skin, green/ yellow foul smelling discharge from the insertion site, fever, chills, leakage around drain site).   Flush drain with 5mL daily. If you meet resistance upon flushing, STOP and contact IR.   Empty drainage bag or bulb daily and record output. Once output is <10mL in a 24hr period or if there is a sudden decrease in output please contact IR to schedule  an appointment.  Drain care:  -Disconnect tubing (tube attached to bag/ bulb)  from the catheter (catheter going into skin)  -Clean catheter with alcohol swab  -Twist on the flush syringe to the catheter (be sure to push the air out of syringe)  -Flush catheter with 5mL (DO NOT pull back on syringe). If you meet resistance upon flushing, STOP and contact IR.   -Disconnect syringe from catheter and reconnect drainage bag or bulb.  Dressing care:  -Wash hands thoroughly with water and soap for at least 20 seconds.   -take off old dressing and clean around drain site with gauze soaked with warm water  -After cleaning the site, dry and replace dressing with a new gauze and tegaderm.   -Place one piece of gauze underneath the drain and another piece of gauze on top of drain.   -Apply tegaderm (clear dressing) on top.  -Change dressing every 3 days or when soiled  Follow up with surgeon to determine surgical candidacy for gallbladder surgery. If instructed by surgeon follow up in 4-6 weeks for cholecystostomy drain evaluation. Otherwise, follow up every 3 months for routine exchange. Call to make appt at [phone]

## 2022-03-08 NOTE — PROGRESS NOTE ADULT - SUBJECTIVE AND OBJECTIVE BOX
24 Hour Events:  - CVC removed  - listed for floor    SUBJECTIVE/ROS: Patient seen and examined at bedside this AM. Pt states he is feeling well, denies significant pain.  Denies nausea, vomiting, chest pain, shortness of breath         MEDICATIONS  (STANDING):  allopurinol 300 milliGRAM(s) Oral daily  atorvastatin 10 milliGRAM(s) Oral at bedtime  chlorhexidine 2% Cloths 1 Application(s) Topical <User Schedule>  enoxaparin Injectable 40 milliGRAM(s) SubCutaneous every 24 hours  fenofibrate Tablet 145 milliGRAM(s) Oral daily  insulin lispro (ADMELOG) corrective regimen sliding scale   SubCutaneous three times a day before meals  insulin lispro (ADMELOG) corrective regimen sliding scale   SubCutaneous at bedtime  piperacillin/tazobactam IVPB.. 3.375 Gram(s) IV Intermittent every 8 hours    MEDICATIONS  (PRN):  acetaminophen     Tablet .. 975 milliGRAM(s) Oral every 6 hours PRN Mild Pain (1 - 3)      OBJECTIVE:    Vital Signs Last 24 Hrs  T(C): 36.7 (08 Mar 2022 07:00), Max: 37 (07 Mar 2022 11:00)  T(F): 98.1 (08 Mar 2022 07:00), Max: 98.6 (07 Mar 2022 11:00)  HR: 71 (08 Mar 2022 10:00) (62 - 82)  BP: 118/64 (08 Mar 2022 10:00) (93/54 - 123/69)  BP(mean): 88 (08 Mar 2022 10:00) (68 - 91)  RR: 13 (08 Mar 2022 10:00) (13 - 22)  SpO2: 97% (08 Mar 2022 10:00) (95% - 99%)        I&O's Detail    07 Mar 2022 07:01  -  08 Mar 2022 07:00  --------------------------------------------------------  IN:    IV PiggyBack: 200 mL    IV PiggyBack: 516.5 mL    Oral Fluid: 900 mL  Total IN: 1616.5 mL    OUT:    Drain (mL): 100 mL    Voided (mL): 2400 mL  Total OUT: 2500 mL    Total NET: -883.5 mL      08 Mar 2022 07:01  -  08 Mar 2022 10:57  --------------------------------------------------------  IN:    IV PiggyBack: 166.6 mL    IV PiggyBack: 50 mL  Total IN: 216.6 mL    OUT:  Total OUT: 0 mL    Total NET: 216.6 mL      Physical Exam:  GEN: resting in bed comfortably in NAD  RESP: Nonlabored respirations, NAD  ABD: soft, nontender. IR drain with bilious output  EXTR: warm, well-perfused, no edema  NEURO: AAOx3    LABS:                        9.2    10.26 )-----------( 282      ( 07 Mar 2022 23:07 )             27.6     03-07    138  |  107  |  16  ----------------------------<  164<H>  4.1   |  18<L>  |  1.13    Ca    8.6      07 Mar 2022 23:07  Phos  2.1     03-07  Mg     1.6     03-07    TPro  6.3  /  Alb  2.7<L>  /  TBili  0.6  /  DBili  x   /  AST  77<H>  /  ALT  70<H>  /  AlkPhos  299<H>  03-07    PT/INR - ( 07 Mar 2022 23:07 )   PT: 13.4 sec;   INR: 1.15 ratio         PTT - ( 07 Mar 2022 23:07 )  PTT:32.4 sec

## 2022-03-08 NOTE — PROGRESS NOTE ADULT - ASSESSMENT
47M w pmhx of DM, HTN, HLD, gout, sleep apnea (CPAP at night)  presenting with worsening RUQ pain. Findings suggestive of perforated gallbladder now s/p IR per randy 3/5.    Plan:  - Pt recovering well, no longer on pressors as of 3/6.   - IV Zosyn, switch to PO abx  - Tolerating PO regular diet  - Dispo planning  - Excellent care per SICU

## 2022-03-08 NOTE — DISCHARGE NOTE PROVIDER - CARE PROVIDER_API CALL
Kg Wood (MD)  Diagnostic Radiology  [address] [place] [address], [address] [place] [address]  Phone: [phone]  Fax: [phone]  Follow Up Time:    Kg Wood)  Diagnostic Radiology  [address], [address] [place] [address]  Phone: [phone]  Fax: [phone]  Follow Up Time:     Radha Anderson)  Surgery  [address] [place] [address], [address]  [place] [address]  Phone: [phone]  Fax: [phone]  Follow Up Time: 2 weeks

## 2022-03-08 NOTE — PROGRESS NOTE ADULT - ASSESSMENT
47M w pmhx of DM, HTN, HLD, gout, sleep apnea (CPAP at night) with perforated acute cholecystitis. SICU consulted for sepsis management requiring pressor support, now improved s/p percutaneous cholecystostomy tube placement 3/5.    Plan:    Neurologic  - Pain control as needed: PO tylenol    Respiratory  Sleep apnea (CPAP HS)  - CPAP overnight for ROCHELLE    Cardiovascular  Septic shock  - off pressors  - Lactate cleared    GI: Acute cholecystitis with perforation s/p perc randy   - consistent carb regular diet  - Monitor perc randy drain output      - Foely removed; passed ToV.    Heme  - LNX 40     ID: Acute cholecystitis with perforation   - Zosyn (3/5- ). Likely will be transitioned to oral therapy tomorrow and discharged home.  - 3/4 BCx GBS, pending 3/5 BCx  - 3/5 bile cx GBS    Endo: DM  - Monitor glucose; maintain under 180 and avoid hypoglycemia  insulin lispro (ADMELOG) corrective regimen sliding scale AC/QHS    Dispo: floor

## 2022-03-08 NOTE — PROGRESS NOTE ADULT - SUBJECTIVE AND OBJECTIVE BOX
SICU Daily Progress Note    History of Present Illness  47M w pmhx of DM, HTN, HLD, gout, sleep apnea (CPAP at night)  presenting with worsening RUQ. Pt states pain started 1 week ago, however at the time was mild and with no associated symptoms. He states that yesterday pain suddenly worsened and he noted greasy stools. He presented to [facility] ER yesterday - US + for cholelithiasis and gallbladder sludge but no evidence of acute randy at that time. Pt was discharged home, however this morning pain worsened as was associated fever of 104F and diaphoresis. He denies cp, sob, n/v, change in bowel habits, urinary symptoms, recent illness or sick contacts. He has never had this pain before. Surgical hx significant for open appendectomy.    In ED pt with tmax 100.3, SBP 80s to low 90s and tachycardia up to 130, resolved after 1L IVF. His labs are significant for no evidence of leukocytosis, ANKUSH (Cr 1.4) and lactate of 2.6. Additionally, T.bili elevated to 1.8 with mild transaminitis. A RUQ US was suggestive of perforated gallbladder with GBD dilated to 9mm.    Patient currently denies headache, dizziness, weakness, fevers, chills, shortness of breath, chest pain, abdominal pain, or nausea/vomiting.    24 Hour Events:  - CVC removed  - listed for floor        SUBJECTIVE/ROS:  A ten-point review of systems was otherwise negative except as noted.  Due to altered mental status/intubation, subjective information were not able to be obtained from the patient. History was obtained, to the extent possible, from review of the chart and collateral sources of information.    NEURO  Exam: awake, alert, oriented x4  Meds: acetaminophen     Tablet .. 975 milliGRAM(s) Oral every 6 hours PRN Mild Pain (1 - 3)      RESPIRATORY  RR: 20 (03-07-22 @ 23:00) (16 - 26)  SpO2: 97% (03-07-22 @ 23:00) (92% - 99%)  Wt(kg): --  Exam: clear to auscultation bilaterally      Meds:     CARDIOVASCULAR  HR: 73 (03-07-22 @ 23:00) (70 - 85)  BP: 99/54 (03-07-22 @ 23:00) (99/54 - 117/73)  BP(mean): 70 (03-07-22 @ 23:00) (70 - 91)  ABP: 123/65 (03-07-22 @ 06:00) (118/63 - 130/65)  ABP(mean): 86 (03-07-22 @ 06:00) (80 - 86)  Wt(kg): --  CVP(cm H2O): --      Exam: regular rate and rhythm  Cardiac Rhythm: sinus   Perfusion     [x]Adequate   [ ]Inadequate  Mentation   [x]Normal       [ ]Reduced  Extremities  [x]Warm         [ ]Cool  Volume Status [ ]Hypervolemic [x]Euvolemic [ ]Hypovolemic  Meds:     GI/NUTRITION  Exam: soft, nontender, nondistended; perc randy drain in place  Diet: regulsr    Meds:     GENITOURINARY  I&O's Detail    03-06 @ 07:01  -  03-07 @ 07:00  --------------------------------------------------------  IN:    IV PiggyBack: 1062.2 mL    IV PiggyBack: 300 mL    IV PiggyBack: 125 mL    IV PiggyBack: 150 mL    multiple electrolytes Injection Type 1.: 1000 mL    Norepinephrine: 32 mL    Vasopressin: 9 mL  Total IN: 2678.2 mL    OUT:    Drain (mL): 90 mL    Ureteral Catheter (mL): 3790 mL  Total OUT: 3880 mL    Total NET: -1201.8 mL      03-07 @ 07:01  -  03-08 @ 01:46  --------------------------------------------------------  IN:    IV PiggyBack: 191.6 mL    IV PiggyBack: 200 mL    Oral Fluid: 900 mL  Total IN: 1291.6 mL    OUT:    Voided (mL): 1500 mL  Total OUT: 1500 mL    Total NET: -208.4 mL          03-07    138  |  107  |  16  ----------------------------<  164<H>  4.1   |  18<L>  |  1.13    Ca    8.6      07 Mar 2022 23:07  Phos  2.1     03-07  Mg     1.6     03-07    TPro  6.3  /  Alb  2.7<L>  /  TBili  0.6  /  DBili  x   /  AST  77<H>  /  ALT  70<H>  /  AlkPhos  299<H>  03-07    [ ] Nelson catheter, indication:   Meds: potassium phosphate IVPB 30 milliMole(s) IV Intermittent once      HEMATOLOGIC  Meds: enoxaparin Injectable 40 milliGRAM(s) SubCutaneous every 24 hours                          9.2    10.26 )-----------( 282      ( 07 Mar 2022 23:07 )             27.6     PT/INR - ( 07 Mar 2022 23:07 )   PT: 13.4 sec;   INR: 1.15 ratio         PTT - ( 07 Mar 2022 23:07 )  PTT:32.4 sec    INFECTIOUS DISEASES  T(C): 36.8 (03-07-22 @ 23:00), Max: 37 (03-07-22 @ 07:00)  Wt(kg): --  WBC Count: 10.26 K/uL (03-07 @ 23:07)    Recent Cultures:  Specimen Source: .Blood Blood-Peripheral, 03-06 @ 00:22; Results   No growth to date.; Gram Stain: --; Organism: --  Specimen Source: .Body Fluid Bile Fluid, 03-05 @ 10:09; Results   Numerous  Streptococcus agalactiae (Group B) isolated  Group B streptococci are susceptible to ampicillin,  penicillin and cefazolin, but may be resistant to  erythromycin and clindamycin.  Recommendations for intrapartum prophylaxis for Group B  streptococci are penicillin or ampicillin.; Gram Stain:   No polymorphonuclear leukocytes seen per low power field  Gram Positive Cocci in Pairs and Chains seen per oil power field  by cytocentrifuge; Organism: --  Specimen Source: .Blood Blood-Peripheral, 03-04 @ 18:49; Results   No growth to date.; Gram Stain:   Growth in aerobic bottle: Gram Positive Cocci in Pairs and Chains; Organism: Blood Culture PCR  Streptococcus agalactiae (Group B)    Meds: piperacillin/tazobactam IVPB.. 3.375 Gram(s) IV Intermittent every 8 hours      ENDOCRINE  Capillary Blood Glucose    Meds: allopurinol 300 milliGRAM(s) Oral daily  atorvastatin 10 milliGRAM(s) Oral at bedtime  fenofibrate Tablet 145 milliGRAM(s) Oral daily  insulin lispro (ADMELOG) corrective regimen sliding scale   SubCutaneous three times a day before meals  insulin lispro (ADMELOG) corrective regimen sliding scale   SubCutaneous at bedtime      ACCESS DEVICES:  [x] Peripheral IV  [ ] Central Venous Line	[ ] R	[ ] L	[ ] IJ	[ ] Fem	[ ] SC	Placed:   [ ] Arterial Line		[ ] R	[ ] L	[ ] Fem	[ ] Rad	[ ] Ax	Placed:   [ ] PICC:					[ ] Mediport  [ ] Urinary Catheter, Date Placed:   [ ] Necessity of urinary, arterial, and venous catheters discussed    OTHER MEDICATIONS:  chlorhexidine 2% Cloths 1 Application(s) Topical <User Schedule>      IMAGING:

## 2022-03-08 NOTE — PROGRESS NOTE ADULT - SUBJECTIVE AND OBJECTIVE BOX
Interventional Radiology Follow-Up Note    This is a 47y Male s/p percutaneous cholecystostomy on 3/5 in Interventional Radiology with Dr. Wood.     S: Patient seen and examined @ bedside. Denies abdominal pain, n/v/d.         Physical Exam:  General: Nontoxic, in NAD, A&O x3.  Abdomen: soft, NTND, no peritoneal signs.  Cholecystostomy Drain: Drain intact attached to bag with dark bile. Dressing clean, dry, intact. Drain flushed with 5cc NS w/o difficulty. +fluid return noted in tubing.      I&O's Detail  07 Mar 2022 07:01  -  08 Mar 2022 07:00   OUT:     PERC SAMMY DRAIN (mL): 100 mL         Medication:  enoxaparin Injectable: (03-07)  piperacillin/tazobactam IVPB..: (03-08)    Vitals:  T(F): 98.1, Max: 98.6 (11:00)  HR: 72  BP: 116/74  RR: 16  SpO2: 99%      LABS:  Na: 138 (03-07 @ 23:07), 138 (03-07 @ 01:12), 135 (03-06 @ 12:59), 131 (03-06 @ 01:14)  K: 4.1 (03-07 @ 23:07), 3.8 (03-07 @ 01:12), 3.3 (03-06 @ 12:59), 3.5 (03-06 @ 01:14)  Cl: 107 (03-07 @ 23:07), 105 (03-07 @ 01:12), 100 (03-06 @ 12:59), 98 (03-06 @ 01:14)  CO2: 18 (03-07 @ 23:07), 21 (03-07 @ 01:12), 21 (03-06 @ 12:59), 21 (03-06 @ 01:14)  BUN: 16 (03-07 @ 23:07), 14 (03-07 @ 01:12), 18 (03-06 @ 12:59), 19 (03-06 @ 01:14)  Cr: 1.13 (03-07 @ 23:07), 0.98 (03-07 @ 01:12), 0.91 (03-06 @ 12:59), 1.12 (03-06 @ 01:14)  Glu: 164(03-07 @ 23:07), 159(03-07 @ 01:12), 167(03-06 @ 12:59), 157(03-06 @ 01:14)    Hgb: 9.2 (03-07 @ 23:07), 8.6 (03-07 @ 01:12), 9.0 (03-06 @ 01:14)  Hct: 27.6 (03-07 @ 23:07), 25.7 (03-07 @ 01:12), 27.0 (03-06 @ 01:14)  WBC: 10.26 (03-07 @ 23:07), 8.83 (03-07 @ 01:12), 10.05 (03-06 @ 01:14)  Plt: 282 (03-07 @ 23:07), 219 (03-07 @ 01:12), 206 (03-06 @ 01:14)    INR: 1.15 03-07-22 @ 23:07, 1.34 03-07-22 @ 01:12, 1.63 03-06-22 @ 01:14  PTT: 32.4 03-07-22 @ 23:07, 34.7 03-07-22 @ 01:12, 32.6 03-06-22 @ 01:14         LIVER FUNCTIONS - ( 07 Mar 2022 23:07 )  Alb: 2.7 g/dL / Pro: 6.3 g/dL / ALK PHOS: 299 U/L / ALT: 70 U/L / AST: 77 U/L / GGT: x             Aspartate Aminotransferase (AST/SGOT): 77 U/L (03-07-22 @ 23:07)  Alanine Aminotransferase (ALT/SGPT): 70 U/L (03-07-22 @ 23:07)  Aspartate Aminotransferase (AST/SGOT): 67 U/L (03-07-22 @ 01:12)  Alanine Aminotransferase (ALT/SGPT): 69 U/L (03-07-22 @ 01:12)  Aspartate Aminotransferase (AST/SGOT): 84 U/L (03-06-22 @ 12:59)  Alanine Aminotransferase (ALT/SGPT): 75 U/L (03-06-22 @ 12:59)      Bilirubin Total, Serum: 0.6 mg/dL (03-07-22 @ 23:07)  Bilirubin Total, Serum: 0.8 mg/dL (03-07-22 @ 01:12)  Bilirubin Total, Serum: 0.8 mg/dL (03-06-22 @ 12:59)         Culture - Body Fluid with Gram Stain (collected 03-05-22 @ 10:09)  Source: .Body Fluid Bile Fluid  Gram Stain (03-05-22 @ 14:39):    No polymorphonuclear leukocytes seen per low power field    Gram Positive Cocci in Pairs and Chains seen per oil power field    by cytocentrifuge  Preliminary Report (03-06-22 @ 13:20):    Numerous    Streptococcus agalactiae (Group B) isolated    Group B streptococci are susceptible to ampicillin,    penicillin and cefazolin, but may be resistant to    erythromycin and clindamycin.    Recommendations for intrapartum prophylaxis for Group B    streptococci are penicillin or ampicillin.            Assessment/Plan:  47y Male admitted with Cholecystitis with perforation of gallbladder s/p percutaneous cholecystostomy on 3/5 in Interventional Radiology with Dr. Wood.     -Drain functioning well  -abx per team  -monitor h/h; transfuse as needed  -trend vs/labs  -flush drain with 5cc NS daily forward only; DO NOT aspirate  -change dressing q3 days or when dressing is saturated  -regarding outpatient follow up with IR, if the patient is d/c home with drainage catheter they can make an appointment with IR by calling the IR booking office at [phone]; recommend IR follow in 6wks for tube evaluation.  -they will benefit from VNS service to help with drainage catheter care; they should continue same drainage catheter care as an outpatient.  -out pt f/u with surgery to determine candidacy for cholecystectomy.   -continue global management per primary team   -Will continue to follow.   -Please call IR at extension [phone] with any questions, concerns, or issues regarding above.      SADAF JALILI, PA-C  available on teams

## 2022-03-09 LAB
CULTURE RESULTS: SIGNIFICANT CHANGE UP
SPECIMEN SOURCE: SIGNIFICANT CHANGE UP

## 2022-03-10 LAB
CULTURE RESULTS: SIGNIFICANT CHANGE UP
SPECIMEN SOURCE: SIGNIFICANT CHANGE UP

## 2022-03-11 LAB
CULTURE RESULTS: SIGNIFICANT CHANGE UP
CULTURE RESULTS: SIGNIFICANT CHANGE UP
SPECIMEN SOURCE: SIGNIFICANT CHANGE UP
SPECIMEN SOURCE: SIGNIFICANT CHANGE UP

## 2022-03-22 PROBLEM — E11.9 TYPE 2 DIABETES MELLITUS WITHOUT COMPLICATIONS: Chronic | Status: ACTIVE | Noted: 2022-03-04

## 2022-03-22 PROBLEM — M10.9 GOUT, UNSPECIFIED: Chronic | Status: ACTIVE | Noted: 2022-03-04

## 2022-03-22 PROBLEM — I10 ESSENTIAL (PRIMARY) HYPERTENSION: Chronic | Status: ACTIVE | Noted: 2022-03-04

## 2022-03-22 PROBLEM — E78.5 HYPERLIPIDEMIA, UNSPECIFIED: Chronic | Status: ACTIVE | Noted: 2022-03-04

## 2022-04-02 ENCOUNTER — OUTPATIENT (OUTPATIENT)
Dept: OUTPATIENT SERVICES | Facility: HOSPITAL | Age: 47
LOS: 1 days | End: 2022-04-02
Payer: COMMERCIAL

## 2022-04-02 DIAGNOSIS — Z11.52 ENCOUNTER FOR SCREENING FOR COVID-19: ICD-10-CM

## 2022-04-02 DIAGNOSIS — Z90.49 ACQUIRED ABSENCE OF OTHER SPECIFIED PARTS OF DIGESTIVE TRACT: Chronic | ICD-10-CM

## 2022-04-02 LAB — SARS-COV-2 RNA SPEC QL NAA+PROBE: SIGNIFICANT CHANGE UP

## 2022-04-02 PROCEDURE — C9803: CPT

## 2022-04-02 PROCEDURE — U0005: CPT

## 2022-04-02 PROCEDURE — U0003: CPT

## 2022-04-05 ENCOUNTER — RESULT REVIEW (OUTPATIENT)
Age: 47
End: 2022-04-05

## 2022-04-05 ENCOUNTER — TRANSCRIPTION ENCOUNTER (OUTPATIENT)
Age: 47
End: 2022-04-05

## 2022-04-05 ENCOUNTER — OUTPATIENT (OUTPATIENT)
Dept: OUTPATIENT SERVICES | Facility: HOSPITAL | Age: 47
LOS: 1 days | End: 2022-04-05
Payer: COMMERCIAL

## 2022-04-05 VITALS — TEMPERATURE: 98 F | OXYGEN SATURATION: 100 %

## 2022-04-05 DIAGNOSIS — E78.5 HYPERLIPIDEMIA, UNSPECIFIED: ICD-10-CM

## 2022-04-05 DIAGNOSIS — Z90.49 ACQUIRED ABSENCE OF OTHER SPECIFIED PARTS OF DIGESTIVE TRACT: Chronic | ICD-10-CM

## 2022-04-05 DIAGNOSIS — Z46.59 ENCOUNTER FOR FITTING AND ADJUSTMENT OF OTHER GASTROINTESTINAL APPLIANCE AND DEVICE: ICD-10-CM

## 2022-04-05 PROCEDURE — C1769: CPT

## 2022-04-05 PROCEDURE — C1729: CPT

## 2022-04-05 PROCEDURE — 47531 INJECTION FOR CHOLANGIOGRAM: CPT

## 2022-04-05 PROCEDURE — C1887: CPT

## 2022-04-05 NOTE — PRE PROCEDURE NOTE - PRE PROCEDURE EVALUATION
Interventional Radiology    HPI: 47y Male with hx of acute cholecystitis s/p drain placement presents to IR for randy tube evaluation.     Allergies: NKDA    Data:    T(C): 36.7  HR: --  BP: --  RR: --  SpO2: 100%    Exam  General: No acute distress  Chest: Non labored breathing  Abdomen: Non-distended  Extremities: No swelling, warm    Plan: 47y Male presents for randy tube evaluation  -Risks/Benefits/alternatives explained with the patient and witnessed informed consent obtained.

## 2022-04-05 NOTE — ASU DISCHARGE PLAN (ADULT/PEDIATRIC) - NS MD DC FALL RISK RISK
For information on Fall & Injury Prevention, visit: [email or web address] OR  [email or web address] OR  https://www.cdc.gov/steadi/patient.html

## 2022-04-05 NOTE — ASU DISCHARGE PLAN (ADULT/PEDIATRIC) - ASU DC SPECIAL INSTRUCTIONSFT
Cholecystostomy Drain Evaluation    Discharge Instructions  - You have had cholecystostomy drain evaluation.  - Keep the area clean and dry.  - Do not soak in a tub or pool with the drain, however you may shower with the drain and dressing covered in plastic wrap.  - Do not put traction on the drain and be careful that the drain does not get accidentally dislodged or kinked.  - Record output daily from the drain. Empty the bag as needed.  - You may resume your normal diet.  - You may resume your normal medications.  - It is normal to experience some pain over the site for the next few days. You may take apply ice to the area (20 minutes on, 20 minutes off) and take Tylenol for that pain. Do not take more frequently than every 6 hours and do not exceed more than 3000mg of Tylenol in a 24 hour period.    Notify your primary physician and/or Interventional Radiology IMMEDIATELY if you experience any of the following       - Fever of 100.4F or 38C       - Chills or Rigors/ Shakes       - Swelling and/or Redness in the area of the puncture site       - Worsening Pain       - Blood soaked bandages or worsening bleeding       - Lightheadedness and/or dizziness upon standing       - Chest Pain/ Tightness       - Shortness of Breath       - Difficulty walking    If you have a problem that you believe requires IMMEDIATE attention, please go to your NEAREST Emergency Room. If you believe your problem can safely wait until you speak to a physician, please call Interventional Radiology for any concerns.    During Normal Weekday Business Hours- You can contact the Interventional Radiology department during normal business hours via telephone.  During Evenings and Weekends- If you need to contact Interventional Radiology during off hours, do so by calling the hospital and requesting to be connected to the Interventional Radiologist on call.

## 2022-04-25 ENCOUNTER — APPOINTMENT (OUTPATIENT)
Dept: TRAUMA SURGERY | Facility: CLINIC | Age: 47
End: 2022-04-25
Payer: COMMERCIAL

## 2022-04-25 VITALS
HEIGHT: 65 IN | DIASTOLIC BLOOD PRESSURE: 78 MMHG | WEIGHT: 250 LBS | HEART RATE: 71 BPM | TEMPERATURE: 98.7 F | SYSTOLIC BLOOD PRESSURE: 110 MMHG | BODY MASS INDEX: 41.65 KG/M2

## 2022-04-25 DIAGNOSIS — Z87.19 PERSONAL HISTORY OF OTHER DISEASES OF THE DIGESTIVE SYSTEM: ICD-10-CM

## 2022-04-25 PROCEDURE — 99212 OFFICE O/P EST SF 10 MIN: CPT

## 2022-04-25 NOTE — HISTORY OF PRESENT ILLNESS
[FreeTextEntry1] : 46 yo male presenting in septic shock from cholecystitis and suspicion of perforation at the beginning of March 2022. Managed with ICU stay, abx, resuscitation and cholecystostomy tube. Comes in today for surgical consult. Denies fever, nausea, abdominal pain or jaundice. Had a recent IR tube check without events.

## 2022-04-25 NOTE — ASSESSMENT
[FreeTextEntry1] : - Has history of DM, HTN, HLD and ROCHELLE. Recently saw PCP, will fax notes from visit. Will get CBC, CMP, HgbA1C. \par - He refers his renal function improved after last admission, he came with ANKUSH.\par - On review of his fluoroscopy, the CBD appears to be almost at a 90 degree angle to the right at the level of the cystic junction, also the cystic appears very long and has an area parallel to the CBD, will order MRCP to better delineate anatomy preoperatively.\par - Plan for surgery in May 2022. Will call with dates.\par - I explained risk of surgery, including bleeding, infection, damage to bile ducts needing reconstruction and conversion to open. He understood and agreed to proceed.

## 2022-04-25 NOTE — PHYSICAL EXAM
[Normal Breath Sounds] : Normal breath sounds [Normal Heart Sounds] : normal heart sounds [No Rash or Lesion] : No rash or lesion [Alert] : alert [de-identified] : no acute distress [de-identified] : Soft, nontender, nondistended. No Kauffman's sign. Paramedian lower abdominal incision from prior appendectomy. Cholecystostomy in place with normal bile. [de-identified] : No jaundice

## 2022-04-28 ENCOUNTER — RESULT REVIEW (OUTPATIENT)
Age: 47
End: 2022-04-28

## 2022-04-28 ENCOUNTER — LABORATORY RESULT (OUTPATIENT)
Age: 47
End: 2022-04-28

## 2022-04-28 ENCOUNTER — OUTPATIENT (OUTPATIENT)
Dept: OUTPATIENT SERVICES | Facility: HOSPITAL | Age: 47
LOS: 1 days | End: 2022-04-28
Payer: COMMERCIAL

## 2022-04-28 ENCOUNTER — APPOINTMENT (OUTPATIENT)
Dept: MRI IMAGING | Facility: CLINIC | Age: 47
End: 2022-04-28
Payer: COMMERCIAL

## 2022-04-28 DIAGNOSIS — K82.A2 PERFORATION OF GALLBLADDER IN CHOLECYSTITIS: ICD-10-CM

## 2022-04-28 DIAGNOSIS — Z90.49 ACQUIRED ABSENCE OF OTHER SPECIFIED PARTS OF DIGESTIVE TRACT: Chronic | ICD-10-CM

## 2022-04-28 PROCEDURE — A9585: CPT

## 2022-04-28 PROCEDURE — 74183 MRI ABD W/O CNTR FLWD CNTR: CPT | Mod: 26

## 2022-04-28 PROCEDURE — 74183 MRI ABD W/O CNTR FLWD CNTR: CPT

## 2022-05-17 ENCOUNTER — OUTPATIENT (OUTPATIENT)
Dept: OUTPATIENT SERVICES | Facility: HOSPITAL | Age: 47
LOS: 1 days | End: 2022-05-17
Payer: COMMERCIAL

## 2022-05-17 VITALS
DIASTOLIC BLOOD PRESSURE: 80 MMHG | HEART RATE: 64 BPM | RESPIRATION RATE: 18 BRPM | WEIGHT: 240.08 LBS | TEMPERATURE: 97 F | HEIGHT: 65 IN | SYSTOLIC BLOOD PRESSURE: 142 MMHG | OXYGEN SATURATION: 98 %

## 2022-05-17 DIAGNOSIS — Z87.19 PERSONAL HISTORY OF OTHER DISEASES OF THE DIGESTIVE SYSTEM: Chronic | ICD-10-CM

## 2022-05-17 DIAGNOSIS — Z90.49 ACQUIRED ABSENCE OF OTHER SPECIFIED PARTS OF DIGESTIVE TRACT: Chronic | ICD-10-CM

## 2022-05-17 DIAGNOSIS — K82.A2 PERFORATION OF GALLBLADDER IN CHOLECYSTITIS: ICD-10-CM

## 2022-05-17 DIAGNOSIS — G47.33 OBSTRUCTIVE SLEEP APNEA (ADULT) (PEDIATRIC): ICD-10-CM

## 2022-05-17 DIAGNOSIS — E11.9 TYPE 2 DIABETES MELLITUS WITHOUT COMPLICATIONS: ICD-10-CM

## 2022-05-17 DIAGNOSIS — Z01.818 ENCOUNTER FOR OTHER PREPROCEDURAL EXAMINATION: ICD-10-CM

## 2022-05-17 LAB
ANION GAP SERPL CALC-SCNC: 15 MMOL/L — SIGNIFICANT CHANGE UP (ref 5–17)
BUN SERPL-MCNC: 17 MG/DL — SIGNIFICANT CHANGE UP (ref 7–23)
CALCIUM SERPL-MCNC: 9.7 MG/DL — SIGNIFICANT CHANGE UP (ref 8.4–10.5)
CHLORIDE SERPL-SCNC: 101 MMOL/L — SIGNIFICANT CHANGE UP (ref 96–108)
CO2 SERPL-SCNC: 22 MMOL/L — SIGNIFICANT CHANGE UP (ref 22–31)
CREAT SERPL-MCNC: 0.85 MG/DL — SIGNIFICANT CHANGE UP (ref 0.5–1.3)
EGFR: 107 ML/MIN/1.73M2 — SIGNIFICANT CHANGE UP
GLUCOSE SERPL-MCNC: 89 MG/DL — SIGNIFICANT CHANGE UP (ref 70–99)
HCT VFR BLD CALC: 38.5 % — LOW (ref 39–50)
HGB BLD-MCNC: 12.9 G/DL — LOW (ref 13–17)
MCHC RBC-ENTMCNC: 29.1 PG — SIGNIFICANT CHANGE UP (ref 27–34)
MCHC RBC-ENTMCNC: 33.5 GM/DL — SIGNIFICANT CHANGE UP (ref 32–36)
MCV RBC AUTO: 86.7 FL — SIGNIFICANT CHANGE UP (ref 80–100)
NRBC # BLD: 0 /100 WBCS — SIGNIFICANT CHANGE UP (ref 0–0)
PLATELET # BLD AUTO: 282 K/UL — SIGNIFICANT CHANGE UP (ref 150–400)
POTASSIUM SERPL-MCNC: 3.7 MMOL/L — SIGNIFICANT CHANGE UP (ref 3.5–5.3)
POTASSIUM SERPL-SCNC: 3.7 MMOL/L — SIGNIFICANT CHANGE UP (ref 3.5–5.3)
RBC # BLD: 4.44 M/UL — SIGNIFICANT CHANGE UP (ref 4.2–5.8)
RBC # FLD: 13 % — SIGNIFICANT CHANGE UP (ref 10.3–14.5)
SODIUM SERPL-SCNC: 138 MMOL/L — SIGNIFICANT CHANGE UP (ref 135–145)
WBC # BLD: 8.47 K/UL — SIGNIFICANT CHANGE UP (ref 3.8–10.5)
WBC # FLD AUTO: 8.47 K/UL — SIGNIFICANT CHANGE UP (ref 3.8–10.5)

## 2022-05-17 PROCEDURE — G0463: CPT

## 2022-05-17 PROCEDURE — 80048 BASIC METABOLIC PNL TOTAL CA: CPT

## 2022-05-17 PROCEDURE — 83036 HEMOGLOBIN GLYCOSYLATED A1C: CPT

## 2022-05-17 PROCEDURE — 85027 COMPLETE CBC AUTOMATED: CPT

## 2022-05-17 RX ORDER — CEFAZOLIN SODIUM 1 G
2000 VIAL (EA) INJECTION ONCE
Refills: 0 | Status: DISCONTINUED | OUTPATIENT
Start: 2022-05-23 | End: 2022-05-23

## 2022-05-17 NOTE — H&P PST ADULT - PROBLEM SELECTOR PLAN 3
Scheduled for sx on 5/23/22. Surgical and chlorhexidine instructions reviewed w/ pt. COVID testing scheduled at [facility] on 5/20/2022.

## 2022-05-17 NOTE — H&P PST ADULT - NSICDXFAMILYHX_GEN_ALL_CORE_FT
FAMILY HISTORY:  No pertinent family history in first degree relatives FAMILY HISTORY:  Father  Still living? Yes, Estimated age: Age Unknown  Family history of hyperlipidemia, Age at diagnosis: Age Unknown  Family hx of hypertension, Age at diagnosis: Age Unknown    Mother  Still living? Yes, Estimated age: Age Unknown  FH: diabetes mellitus, Age at diagnosis: Age Unknown

## 2022-05-17 NOTE — H&P PST ADULT - FALL HARM RISK - UNIVERSAL INTERVENTIONS
Bed in lowest position, wheels locked, appropriate side rails in place/Call bell, personal items and telephone in reach/Instruct patient to call for assistance before getting out of bed or chair/Non-slip footwear when patient is out of bed/[place] to call system/Physically safe environment - no spills, clutter or unnecessary equipment/Purposeful Proactive Rounding/Room/bathroom lighting operational, light cord in reach

## 2022-05-17 NOTE — H&P PST ADULT - NSICDXPASTMEDICALHX_GEN_ALL_CORE_FT
PAST MEDICAL HISTORY:  Diabetes mellitus     Gout     HLD (hyperlipidemia)     HTN (hypertension)     ROCHELLE (obstructive sleep apnea)      PAST MEDICAL HISTORY:  Diabetes mellitus     Gout     HLD (hyperlipidemia)     HTN (hypertension)     ROCHELLE (obstructive sleep apnea) CPAP 10    Perforation of gallbladder in cholecystitis

## 2022-05-17 NOTE — H&P PST ADULT - HISTORY OF PRESENT ILLNESS
47M w pmhx of DM2, HTN, HLD, gout, sleep apnea (CPAP 10 at night) who presented in 3/22' with worsening RUQ pain. Findings were suggestive of perforated gallbladder; s/p IR per randy 3/5/22. Denies cp, sob, n/v, change in bowel habits, urinary symptoms, recent illness or sick contacts. He has never had this pain before. Surgical hx significant for open appendectomy.  RUQ US was suggestive of perforated gallbladder with GBD dilated to 9mm.    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 Findings were suggestive of perforated gallbladder; s/p IR per randy 3/5/22. Now presents to [facility] accompanied by spouse for a Laparoscopic Cholecystectomy on 5/23/2022. Denies c/p, sob, n/v, change in bowel habits, urinary symptoms, recent illness or sick contacts. He has never had this pain before. Surgical hx significant for open appendectomy. COVID testing scheduled at [facility] on 5/20/2022.

## 2022-05-17 NOTE — H&P PST ADULT - PROBLEM SELECTOR PLAN 2
HgbA1C sent in PST today. GFS to be done in AM day of surgery. Medications reviewed w/ pt and instructions provided by Endo as well.

## 2022-05-17 NOTE — H&P PST ADULT - NSICDXPASTSURGICALHX_GEN_ALL_CORE_FT
PAST SURGICAL HISTORY:  S/P appendectomy Open     PAST SURGICAL HISTORY:  History of cholecystitis choley tube    S/P appendectomy Open

## 2022-05-18 LAB
A1C WITH ESTIMATED AVERAGE GLUCOSE RESULT: 5.8 % — HIGH (ref 4–5.6)
ESTIMATED AVERAGE GLUCOSE: 120 MG/DL — HIGH (ref 68–114)

## 2022-05-20 ENCOUNTER — OUTPATIENT (OUTPATIENT)
Dept: OUTPATIENT SERVICES | Facility: HOSPITAL | Age: 47
LOS: 1 days | End: 2022-05-20
Payer: COMMERCIAL

## 2022-05-20 DIAGNOSIS — Z11.52 ENCOUNTER FOR SCREENING FOR COVID-19: ICD-10-CM

## 2022-05-20 DIAGNOSIS — Z87.19 PERSONAL HISTORY OF OTHER DISEASES OF THE DIGESTIVE SYSTEM: Chronic | ICD-10-CM

## 2022-05-20 DIAGNOSIS — Z90.49 ACQUIRED ABSENCE OF OTHER SPECIFIED PARTS OF DIGESTIVE TRACT: Chronic | ICD-10-CM

## 2022-05-20 LAB — SARS-COV-2 RNA SPEC QL NAA+PROBE: SIGNIFICANT CHANGE UP

## 2022-05-20 PROCEDURE — C9803: CPT

## 2022-05-20 PROCEDURE — U0003: CPT

## 2022-05-20 PROCEDURE — U0005: CPT

## 2022-05-22 ENCOUNTER — TRANSCRIPTION ENCOUNTER (OUTPATIENT)
Age: 47
End: 2022-05-22

## 2022-05-23 ENCOUNTER — RESULT REVIEW (OUTPATIENT)
Age: 47
End: 2022-05-23

## 2022-05-23 ENCOUNTER — APPOINTMENT (OUTPATIENT)
Dept: TRAUMA SURGERY | Facility: HOSPITAL | Age: 47
End: 2022-05-23

## 2022-05-23 ENCOUNTER — OUTPATIENT (OUTPATIENT)
Dept: INPATIENT UNIT | Facility: HOSPITAL | Age: 47
LOS: 1 days | End: 2022-05-23
Payer: COMMERCIAL

## 2022-05-23 VITALS
RESPIRATION RATE: 16 BRPM | WEIGHT: 240.08 LBS | SYSTOLIC BLOOD PRESSURE: 132 MMHG | DIASTOLIC BLOOD PRESSURE: 83 MMHG | HEART RATE: 53 BPM | OXYGEN SATURATION: 98 % | HEIGHT: 65 IN | TEMPERATURE: 99 F

## 2022-05-23 DIAGNOSIS — K82.A2 PERFORATION OF GALLBLADDER IN CHOLECYSTITIS: ICD-10-CM

## 2022-05-23 DIAGNOSIS — Z87.19 PERSONAL HISTORY OF OTHER DISEASES OF THE DIGESTIVE SYSTEM: Chronic | ICD-10-CM

## 2022-05-23 DIAGNOSIS — Z90.49 ACQUIRED ABSENCE OF OTHER SPECIFIED PARTS OF DIGESTIVE TRACT: Chronic | ICD-10-CM

## 2022-05-23 LAB
ALBUMIN SERPL ELPH-MCNC: 4.3 G/DL — SIGNIFICANT CHANGE UP (ref 3.3–5)
ALP SERPL-CCNC: 54 U/L — SIGNIFICANT CHANGE UP (ref 40–120)
ALT FLD-CCNC: 71 U/L — HIGH (ref 10–45)
ANION GAP SERPL CALC-SCNC: 13 MMOL/L — SIGNIFICANT CHANGE UP (ref 5–17)
AST SERPL-CCNC: 85 U/L — HIGH (ref 10–40)
BILIRUB SERPL-MCNC: 0.5 MG/DL — SIGNIFICANT CHANGE UP (ref 0.2–1.2)
BUN SERPL-MCNC: 15 MG/DL — SIGNIFICANT CHANGE UP (ref 7–23)
CALCIUM SERPL-MCNC: 9.3 MG/DL — SIGNIFICANT CHANGE UP (ref 8.4–10.5)
CHLORIDE SERPL-SCNC: 101 MMOL/L — SIGNIFICANT CHANGE UP (ref 96–108)
CO2 SERPL-SCNC: 25 MMOL/L — SIGNIFICANT CHANGE UP (ref 22–31)
CREAT SERPL-MCNC: 0.86 MG/DL — SIGNIFICANT CHANGE UP (ref 0.5–1.3)
EGFR: 107 ML/MIN/1.73M2 — SIGNIFICANT CHANGE UP
GLUCOSE BLDC GLUCOMTR-MCNC: 136 MG/DL — HIGH (ref 70–99)
GLUCOSE BLDC GLUCOMTR-MCNC: 238 MG/DL — HIGH (ref 70–99)
GLUCOSE SERPL-MCNC: 171 MG/DL — HIGH (ref 70–99)
HCT VFR BLD CALC: 38 % — LOW (ref 39–50)
HGB BLD-MCNC: 12.9 G/DL — LOW (ref 13–17)
MAGNESIUM SERPL-MCNC: 1.9 MG/DL — SIGNIFICANT CHANGE UP (ref 1.6–2.6)
MCHC RBC-ENTMCNC: 29.6 PG — SIGNIFICANT CHANGE UP (ref 27–34)
MCHC RBC-ENTMCNC: 33.9 GM/DL — SIGNIFICANT CHANGE UP (ref 32–36)
MCV RBC AUTO: 87.2 FL — SIGNIFICANT CHANGE UP (ref 80–100)
NRBC # BLD: 0 /100 WBCS — SIGNIFICANT CHANGE UP (ref 0–0)
PHOSPHATE SERPL-MCNC: 3.2 MG/DL — SIGNIFICANT CHANGE UP (ref 2.5–4.5)
PLATELET # BLD AUTO: 253 K/UL — SIGNIFICANT CHANGE UP (ref 150–400)
POTASSIUM SERPL-MCNC: 3.7 MMOL/L — SIGNIFICANT CHANGE UP (ref 3.5–5.3)
POTASSIUM SERPL-SCNC: 3.7 MMOL/L — SIGNIFICANT CHANGE UP (ref 3.5–5.3)
PROT SERPL-MCNC: 7.3 G/DL — SIGNIFICANT CHANGE UP (ref 6–8.3)
RBC # BLD: 4.36 M/UL — SIGNIFICANT CHANGE UP (ref 4.2–5.8)
RBC # FLD: 12.8 % — SIGNIFICANT CHANGE UP (ref 10.3–14.5)
SODIUM SERPL-SCNC: 139 MMOL/L — SIGNIFICANT CHANGE UP (ref 135–145)
WBC # BLD: 13.02 K/UL — HIGH (ref 3.8–10.5)
WBC # FLD AUTO: 13.02 K/UL — HIGH (ref 3.8–10.5)

## 2022-05-23 PROCEDURE — 88304 TISSUE EXAM BY PATHOLOGIST: CPT | Mod: 26

## 2022-05-23 PROCEDURE — 47562 LAPAROSCOPIC CHOLECYSTECTOMY: CPT

## 2022-05-23 DEVICE — VISTASEAL FIBRIN HUMAN 4ML: Type: IMPLANTABLE DEVICE | Status: FUNCTIONAL

## 2022-05-23 DEVICE — CLIP APPLIER COVIDIEN ENDOCLIP III 5MM: Type: IMPLANTABLE DEVICE | Status: FUNCTIONAL

## 2022-05-23 DEVICE — LIGATING CLIPS WECK HEMOLOK POLYMER LARGE (PURPLE) 6: Type: IMPLANTABLE DEVICE | Status: FUNCTIONAL

## 2022-05-23 RX ORDER — OXYCODONE HYDROCHLORIDE 5 MG/1
5 TABLET ORAL EVERY 4 HOURS
Refills: 0 | Status: DISCONTINUED | OUTPATIENT
Start: 2022-05-23 | End: 2022-05-24

## 2022-05-23 RX ORDER — ALLOPURINOL 300 MG
1 TABLET ORAL
Qty: 0 | Refills: 0 | DISCHARGE

## 2022-05-23 RX ORDER — ATORVASTATIN CALCIUM 80 MG/1
10 TABLET, FILM COATED ORAL AT BEDTIME
Refills: 0 | Status: DISCONTINUED | OUTPATIENT
Start: 2022-05-23 | End: 2022-06-06

## 2022-05-23 RX ORDER — METFORMIN HYDROCHLORIDE 850 MG/1
1 TABLET ORAL
Qty: 0 | Refills: 0 | DISCHARGE

## 2022-05-23 RX ORDER — ALLOPURINOL 300 MG
300 TABLET ORAL DAILY
Refills: 0 | Status: DISCONTINUED | OUTPATIENT
Start: 2022-05-23 | End: 2022-06-06

## 2022-05-23 RX ORDER — INSULIN LISPRO 100/ML
VIAL (ML) SUBCUTANEOUS
Refills: 0 | Status: DISCONTINUED | OUTPATIENT
Start: 2022-05-23 | End: 2022-06-06

## 2022-05-23 RX ORDER — LIDOCAINE HCL 20 MG/ML
0.2 VIAL (ML) INJECTION ONCE
Refills: 0 | Status: DISCONTINUED | OUTPATIENT
Start: 2022-05-23 | End: 2022-05-23

## 2022-05-23 RX ORDER — HEPARIN SODIUM 5000 [USP'U]/ML
5000 INJECTION INTRAVENOUS; SUBCUTANEOUS EVERY 8 HOURS
Refills: 0 | Status: DISCONTINUED | OUTPATIENT
Start: 2022-05-23 | End: 2022-06-06

## 2022-05-23 RX ORDER — SODIUM CHLORIDE 9 MG/ML
3 INJECTION INTRAMUSCULAR; INTRAVENOUS; SUBCUTANEOUS EVERY 8 HOURS
Refills: 0 | Status: DISCONTINUED | OUTPATIENT
Start: 2022-05-23 | End: 2022-05-23

## 2022-05-23 RX ORDER — POTASSIUM CHLORIDE 20 MEQ
20 PACKET (EA) ORAL ONCE
Refills: 0 | Status: COMPLETED | OUTPATIENT
Start: 2022-05-23 | End: 2022-05-23

## 2022-05-23 RX ORDER — SODIUM CHLORIDE 9 MG/ML
1000 INJECTION, SOLUTION INTRAVENOUS
Refills: 0 | Status: DISCONTINUED | OUTPATIENT
Start: 2022-05-23 | End: 2022-06-06

## 2022-05-23 RX ORDER — HYDROCORTISONE 1 %
1 OINTMENT (GRAM) TOPICAL
Refills: 0 | Status: DISCONTINUED | OUTPATIENT
Start: 2022-05-23 | End: 2022-06-06

## 2022-05-23 RX ORDER — ATORVASTATIN CALCIUM 80 MG/1
1 TABLET, FILM COATED ORAL
Qty: 0 | Refills: 0 | DISCHARGE

## 2022-05-23 RX ORDER — GLIMEPIRIDE 1 MG
0.5 TABLET ORAL
Qty: 0 | Refills: 0 | DISCHARGE

## 2022-05-23 RX ORDER — HYDROMORPHONE HYDROCHLORIDE 2 MG/ML
0.5 INJECTION INTRAMUSCULAR; INTRAVENOUS; SUBCUTANEOUS
Refills: 0 | Status: DISCONTINUED | OUTPATIENT
Start: 2022-05-23 | End: 2022-05-24

## 2022-05-23 RX ORDER — HYDROCHLOROTHIAZIDE 25 MG
25 TABLET ORAL DAILY
Refills: 0 | Status: DISCONTINUED | OUTPATIENT
Start: 2022-05-23 | End: 2022-06-06

## 2022-05-23 RX ORDER — FENOFIBRATE,MICRONIZED 130 MG
1 CAPSULE ORAL
Qty: 0 | Refills: 0 | DISCHARGE

## 2022-05-23 RX ORDER — AMLODIPINE BESYLATE 2.5 MG/1
5 TABLET ORAL DAILY
Refills: 0 | Status: DISCONTINUED | OUTPATIENT
Start: 2022-05-23 | End: 2022-06-06

## 2022-05-23 RX ORDER — CHLORHEXIDINE GLUCONATE 213 G/1000ML
1 SOLUTION TOPICAL ONCE
Refills: 0 | Status: DISCONTINUED | OUTPATIENT
Start: 2022-05-23 | End: 2022-05-23

## 2022-05-23 RX ORDER — INSULIN LISPRO 100/ML
VIAL (ML) SUBCUTANEOUS AT BEDTIME
Refills: 0 | Status: DISCONTINUED | OUTPATIENT
Start: 2022-05-23 | End: 2022-06-06

## 2022-05-23 RX ORDER — ONDANSETRON 8 MG/1
4 TABLET, FILM COATED ORAL ONCE
Refills: 0 | Status: DISCONTINUED | OUTPATIENT
Start: 2022-05-23 | End: 2022-05-24

## 2022-05-23 RX ORDER — GLUCAGON INJECTION, SOLUTION 0.5 MG/.1ML
1 INJECTION, SOLUTION SUBCUTANEOUS ONCE
Refills: 0 | Status: DISCONTINUED | OUTPATIENT
Start: 2022-05-23 | End: 2022-06-06

## 2022-05-23 RX ORDER — AMLODIPINE BESYLATE 2.5 MG/1
1 TABLET ORAL
Qty: 0 | Refills: 0 | DISCHARGE

## 2022-05-23 RX ADMIN — HEPARIN SODIUM 5000 UNIT(S): 5000 INJECTION INTRAVENOUS; SUBCUTANEOUS at 21:59

## 2022-05-23 RX ADMIN — Medication 20 MILLIEQUIVALENT(S): at 22:00

## 2022-05-23 RX ADMIN — OXYCODONE HYDROCHLORIDE 5 MILLIGRAM(S): 5 TABLET ORAL at 23:00

## 2022-05-23 RX ADMIN — ATORVASTATIN CALCIUM 10 MILLIGRAM(S): 80 TABLET, FILM COATED ORAL at 22:00

## 2022-05-23 NOTE — PRE-ANESTHESIA EVALUATION ADULT - NSANTHSUBSTSD_GEN_ALL_CORE
No patient states that after moving bowels, when flushing the toilet noted blood in the stool, which was concerning, so was brought to ED for evaluation. Patient denies any cp/sob/abd pain/trauma/n/v/lightheadedness. Patient is not on blood thinners.   patient is asymptomatic in ED  lungs: CTA  abd: +BS, NT, ND, soft  A/P: minor rectal bleeding  labs, reevaluation

## 2022-05-23 NOTE — PATIENT PROFILE ADULT - FALL HARM RISK - UNIVERSAL INTERVENTIONS
Bed in lowest position, wheels locked, appropriate side rails in place/Call bell, personal items and telephone in reach/Instruct patient to call for assistance before getting out of bed or chair/Non-slip footwear when patient is out of bed/[place] to call system/Physically safe environment - no spills, clutter or unnecessary equipment/Purposeful Proactive Rounding/Room/bathroom lighting operational, light cord in reach

## 2022-05-23 NOTE — CHART NOTE - NSCHARTNOTEFT_GEN_A_CORE
STATUS POST:  laparoscopic cholecystectomy   POST OPERATIVE DAY #: 0      Patient seen and examined at bedside. Pain controlled. tolerating clears. denies dizziness, SOB, CP or palpitations. denies f/c/n/v    Vital Signs Last 24 Hrs  T(C): 36.7 (23 May 2022 17:17), Max: 37.1 (23 May 2022 09:16)  T(F): 98.1 (23 May 2022 17:17), Max: 98.8 (23 May 2022 09:16)  HR: 74 (23 May 2022 22:00) (53 - 82)  BP: 132/76 (23 May 2022 22:00) (126/77 - 155/77)  BP(mean): 100 (23 May 2022 22:00) (95 - 114)  RR: 16 (23 May 2022 22:00) (16 - 18)  SpO2: 97% (23 May 2022 22:00) (94% - 100%)  I&O's Summary    23 May 2022 07:01  -  23 May 2022 22:56  --------------------------------------------------------  IN: 360 mL / OUT: 600 mL / NET: -240 mL      I&O's Detail    23 May 2022 07:01  -  23 May 2022 22:56  --------------------------------------------------------  IN:    Oral Fluid: 360 mL  Total IN: 360 mL    OUT:    Voided (mL): 600 mL  Total OUT: 600 mL    Total NET: -240 mL          MEDICATIONS  (STANDING):  allopurinol 300 milliGRAM(s) Oral daily  amLODIPine   Tablet 5 milliGRAM(s) Oral daily  atorvastatin 10 milliGRAM(s) Oral at bedtime  dextrose 5%. 1000 milliLiter(s) (100 mL/Hr) IV Continuous <Continuous>  glucagon  Injectable 1 milliGRAM(s) IntraMuscular once  heparin   Injectable 5000 Unit(s) SubCutaneous every 8 hours  hydrochlorothiazide 25 milliGRAM(s) Oral daily  hydrocortisone 1% Cream 1 Application(s) Topical two times a day  insulin lispro (ADMELOG) corrective regimen sliding scale   SubCutaneous three times a day before meals  insulin lispro (ADMELOG) corrective regimen sliding scale   SubCutaneous at bedtime    MEDICATIONS  (PRN):  HYDROmorphone  Injectable 0.5 milliGRAM(s) IV Push every 10 minutes PRN Moderate Pain (4 - 6)  ondansetron Injectable 4 milliGRAM(s) IV Push once PRN Nausea and/or Vomiting  oxyCODONE    IR 5 milliGRAM(s) Oral every 4 hours PRN Moderate Pain (4 - 6)      LABS:                        12.9   13.02 )-----------( 253      ( 23 May 2022 19:00 )             38.0     05-23    139  |  101  |  15  ----------------------------<  171<H>  3.7   |  25  |  0.86    Ca    9.3      23 May 2022 19:00  Phos  3.2     05-23  Mg     1.9     05-23    TPro  7.3  /  Alb  4.3  /  TBili  0.5  /  DBili  x   /  AST  85<H>  /  ALT  71<H>  /  AlkPhos  54  05-23          RADIOLOGY & ADDITIONAL STUDIES:    PHYSICAL EXAM:      Constitutional: NAD, A&O x 4  Respiratory: nonlabor breathing   Cardiovascular: RRR  Gastrointestinal: abd soft, ND/NT, port sites c/d/i. skin erythema at RUQ from previous dressing           A/P: 47y Male Cholecystitis with perforation of gallbladder now POD #0 s/p laparoscopic cholecystectomy       - Diet: regular diet  - Activity: OOB as tolerated  - Labs: f/u AM labs  - Pain medication: tylenol, oxycodone prn   - hydrocortisone cream to affected area  - DVT ppx: [facility]    ACS  [id number]

## 2022-05-24 ENCOUNTER — TRANSCRIPTION ENCOUNTER (OUTPATIENT)
Age: 47
End: 2022-05-24

## 2022-05-24 VITALS
TEMPERATURE: 98 F | DIASTOLIC BLOOD PRESSURE: 68 MMHG | HEART RATE: 91 BPM | RESPIRATION RATE: 16 BRPM | OXYGEN SATURATION: 97 % | SYSTOLIC BLOOD PRESSURE: 130 MMHG

## 2022-05-24 LAB
ALBUMIN SERPL ELPH-MCNC: 4 G/DL — SIGNIFICANT CHANGE UP (ref 3.3–5)
ALP SERPL-CCNC: 48 U/L — SIGNIFICANT CHANGE UP (ref 40–120)
ALT FLD-CCNC: 55 U/L — HIGH (ref 10–45)
ANION GAP SERPL CALC-SCNC: 12 MMOL/L — SIGNIFICANT CHANGE UP (ref 5–17)
AST SERPL-CCNC: 62 U/L — HIGH (ref 10–40)
BILIRUB SERPL-MCNC: 0.8 MG/DL — SIGNIFICANT CHANGE UP (ref 0.2–1.2)
BUN SERPL-MCNC: 17 MG/DL — SIGNIFICANT CHANGE UP (ref 7–23)
CALCIUM SERPL-MCNC: 9.2 MG/DL — SIGNIFICANT CHANGE UP (ref 8.4–10.5)
CHLORIDE SERPL-SCNC: 102 MMOL/L — SIGNIFICANT CHANGE UP (ref 96–108)
CO2 SERPL-SCNC: 25 MMOL/L — SIGNIFICANT CHANGE UP (ref 22–31)
CREAT SERPL-MCNC: 0.9 MG/DL — SIGNIFICANT CHANGE UP (ref 0.5–1.3)
EGFR: 105 ML/MIN/1.73M2 — SIGNIFICANT CHANGE UP
GLUCOSE BLDC GLUCOMTR-MCNC: 144 MG/DL — HIGH (ref 70–99)
GLUCOSE SERPL-MCNC: 143 MG/DL — HIGH (ref 70–99)
HCT VFR BLD CALC: 37.2 % — LOW (ref 39–50)
HGB BLD-MCNC: 12.5 G/DL — LOW (ref 13–17)
MAGNESIUM SERPL-MCNC: 1.8 MG/DL — SIGNIFICANT CHANGE UP (ref 1.6–2.6)
MCHC RBC-ENTMCNC: 29.2 PG — SIGNIFICANT CHANGE UP (ref 27–34)
MCHC RBC-ENTMCNC: 33.6 GM/DL — SIGNIFICANT CHANGE UP (ref 32–36)
MCV RBC AUTO: 86.9 FL — SIGNIFICANT CHANGE UP (ref 80–100)
NRBC # BLD: 0 /100 WBCS — SIGNIFICANT CHANGE UP (ref 0–0)
PHOSPHATE SERPL-MCNC: 3.5 MG/DL — SIGNIFICANT CHANGE UP (ref 2.5–4.5)
PLATELET # BLD AUTO: 256 K/UL — SIGNIFICANT CHANGE UP (ref 150–400)
POTASSIUM SERPL-MCNC: 3.7 MMOL/L — SIGNIFICANT CHANGE UP (ref 3.5–5.3)
POTASSIUM SERPL-SCNC: 3.7 MMOL/L — SIGNIFICANT CHANGE UP (ref 3.5–5.3)
PROT SERPL-MCNC: 7.2 G/DL — SIGNIFICANT CHANGE UP (ref 6–8.3)
RBC # BLD: 4.28 M/UL — SIGNIFICANT CHANGE UP (ref 4.2–5.8)
RBC # FLD: 13.2 % — SIGNIFICANT CHANGE UP (ref 10.3–14.5)
SODIUM SERPL-SCNC: 139 MMOL/L — SIGNIFICANT CHANGE UP (ref 135–145)
WBC # BLD: 13.29 K/UL — HIGH (ref 3.8–10.5)
WBC # FLD AUTO: 13.29 K/UL — HIGH (ref 3.8–10.5)

## 2022-05-24 PROCEDURE — 83735 ASSAY OF MAGNESIUM: CPT

## 2022-05-24 PROCEDURE — C1889: CPT

## 2022-05-24 PROCEDURE — 82962 GLUCOSE BLOOD TEST: CPT

## 2022-05-24 PROCEDURE — 85027 COMPLETE CBC AUTOMATED: CPT

## 2022-05-24 PROCEDURE — 84100 ASSAY OF PHOSPHORUS: CPT

## 2022-05-24 PROCEDURE — C9399: CPT

## 2022-05-24 PROCEDURE — 80053 COMPREHEN METABOLIC PANEL: CPT

## 2022-05-24 PROCEDURE — 47562 LAPAROSCOPIC CHOLECYSTECTOMY: CPT

## 2022-05-24 PROCEDURE — 88304 TISSUE EXAM BY PATHOLOGIST: CPT

## 2022-05-24 RX ORDER — OXYCODONE HYDROCHLORIDE 5 MG/1
1 TABLET ORAL
Qty: 12 | Refills: 0
Start: 2022-05-24 | End: 2022-05-26

## 2022-05-24 RX ORDER — RAMIPRIL 5 MG
1 CAPSULE ORAL
Qty: 0 | Refills: 0 | DISCHARGE

## 2022-05-24 RX ORDER — DULAGLUTIDE 4.5 MG/.5ML
1 INJECTION, SOLUTION SUBCUTANEOUS
Qty: 0 | Refills: 0 | DISCHARGE

## 2022-05-24 RX ADMIN — OXYCODONE HYDROCHLORIDE 5 MILLIGRAM(S): 5 TABLET ORAL at 00:00

## 2022-05-24 RX ADMIN — Medication 1 APPLICATION(S): at 06:00

## 2022-05-24 RX ADMIN — OXYCODONE HYDROCHLORIDE 5 MILLIGRAM(S): 5 TABLET ORAL at 07:00

## 2022-05-24 RX ADMIN — OXYCODONE HYDROCHLORIDE 5 MILLIGRAM(S): 5 TABLET ORAL at 06:25

## 2022-05-24 RX ADMIN — AMLODIPINE BESYLATE 5 MILLIGRAM(S): 2.5 TABLET ORAL at 06:00

## 2022-05-24 RX ADMIN — HEPARIN SODIUM 5000 UNIT(S): 5000 INJECTION INTRAVENOUS; SUBCUTANEOUS at 06:00

## 2022-05-24 RX ADMIN — Medication 25 MILLIGRAM(S): at 08:22

## 2022-05-24 NOTE — DISCHARGE NOTE NURSING/CASE MANAGEMENT/SOCIAL WORK - NSDCPEFALRISK_GEN_ALL_CORE
For information on Fall & Injury Prevention, visit: [email or web address] OR  [email or web address] OR  https://www.cdc.gov/steadi/patient.html

## 2022-05-24 NOTE — DISCHARGE NOTE PROVIDER - HOSPITAL COURSE
47y Male with pmhx of chronic cholecystitis presented to [facility] on 5/23 for laparoscopic cholecystectomy.  Pt tolerated procedure well and uncomplicated.   In the PACU, the patients' pain was controlled, vitals stable, tolerating PO, and voiding appropriately.  The patient was transferred to the surgical floor in stable condition.   On day of discharge, the patient was tolerating diet, ambulating well and pain controlled. All questions were answered and patient safely discharged home.    47y Male with pmhx of chronic cholecystitis presented to [facility] on 5/23 for laparoscopic cholecystectomy.  Pt tolerated procedure well and uncomplicated.  In the PACU, the patients' pain was controlled, vitals stable, tolerating PO, and voiding appropriately.   On day of discharge, the patient was tolerating diet, ambulating well and pain controlled. All questions were answered and patient safely discharged home.

## 2022-05-24 NOTE — DISCHARGE NOTE NURSING/CASE MANAGEMENT/SOCIAL WORK - PATIENT PORTAL LINK FT
You can access the FollowMyHealth Patient Portal offered by [facility] by registering at the following website: [email or web address]. By joining [facility]’s FollowMyHealth portal, you will also be able to view your health information using other applications (apps) compatible with our system.

## 2022-05-24 NOTE — PROGRESS NOTE ADULT - ASSESSMENT
A/P: 47y Male Cholecystitis with perforation of gallbladder now POD #1 s/p laparoscopic cholecystectomy       - Diet: regular diet  - Activity: OOB as tolerated  - Labs: f/u AM labs  - Pain medication: tylenol, oxycodone prn   - hydrocortisone cream to affected area  - DVT ppx: [facility]  [id number]. A/P: 47y Male Cholecystitis with perforation of gallbladder now POD #1 s/p laparoscopic cholecystectomy     - Diet: regular diet  - Activity: OOB as tolerated  - Labs: f/u AM labs  - Pain medication: tylenol, oxycodone prn   - hydrocortisone cream to affected area  - DVT ppx: [facility]  - Dispo: Home today    ACS  [id number].

## 2022-05-24 NOTE — DISCHARGE NOTE PROVIDER - NSDCMRMEDTOKEN_GEN_ALL_CORE_FT
acetaminophen 325 mg oral tablet: 3 tab(s) orally every 6 hours, As needed, Mild Pain (1 - 3)  allopurinol 300 mg oral tablet: 1 tab(s) orally once a day  amLODIPine 5 mg oral tablet: 1 tab(s) orally once a day; on hold as per pt  atorvastatin 10 mg oral tablet: 1 tab(s) orally once a day  fenofibrate 145 mg oral tablet: 1 tab(s) orally once a day; on hold till after sx  glimepiride 1 mg oral tablet: 0.5 tab(s) orally once a day in PM  hydroCHLOROthiazide 25 mg oral tablet: 1 tab(s) orally once a day  metFORMIN 1000 mg oral tablet: 1 tab(s) orally 2 times a day  oxyCODONE 5 mg oral tablet: 1 tab(s) orally every 6 hours, As Needed -Moderate Pain (4 - 6) MDD:4

## 2022-05-24 NOTE — DISCHARGE NOTE PROVIDER - NSDCCPCAREPLAN_GEN_ALL_CORE_FT
PRINCIPAL DISCHARGE DIAGNOSIS  Diagnosis: Chronic cholecystitis  Assessment and Plan of Treatment: WOUND CARE:  You may remove the clear dressing, underneath you will find steri strips resembling tape. Please leave these on. If they come off on their own- that is okay. Otherwise, your surgeon will remove them in the office.   BATHING: You may shower and/or sponge bathe.  ACTIVITY: No heavy lifting anything more than 10-15lbs or straining. Otherwise, you may return to your usual level of physical activity. If you are taking narcotic pain medication (such as Percocet), do NOT drive a car, operate machinery or make important decisions.  NOTIFY YOUR SURGEON IF: You have any bleeding that does not stop, any fever (over 100.4 F) or chills, persistent nausea/vomiting with inability to tolerate food or liquids, persistent diarrhea, or if your pain is not controlled on your discharge pain medications.  FOLLOW-UP:  Please follow up with   in one week regarding your hospitalization.

## 2022-05-24 NOTE — DISCHARGE NOTE PROVIDER - CARE PROVIDER_API CALL
Medardo York)  Surgery; Surgical Critical Care  [address], [address]  [place] [address]  Phone: [phone]  Fax: [phone]  Follow Up Time: 1 week

## 2022-05-24 NOTE — DISCHARGE NOTE PROVIDER - NSDCACTIVITY_GEN_ALL_CORE
No heavy lifting/straining/Follow Instructions Provided by your Surgical Team Do not drive or operate machinery/Showering allowed/No heavy lifting/straining

## 2022-05-24 NOTE — PROGRESS NOTE ADULT - SUBJECTIVE AND OBJECTIVE BOX
ACS DAILY PROGRESS NOTE:       SUBJECTIVE/ROS: No acute events overnight         MEDICATIONS  (STANDING):  allopurinol 300 milliGRAM(s) Oral daily  amLODIPine   Tablet 5 milliGRAM(s) Oral daily  atorvastatin 10 milliGRAM(s) Oral at bedtime  dextrose 5%. 1000 milliLiter(s) (100 mL/Hr) IV Continuous <Continuous>  glucagon  Injectable 1 milliGRAM(s) IntraMuscular once  heparin   Injectable 5000 Unit(s) SubCutaneous every 8 hours  hydrochlorothiazide 25 milliGRAM(s) Oral daily  hydrocortisone 1% Cream 1 Application(s) Topical two times a day  insulin lispro (ADMELOG) corrective regimen sliding scale   SubCutaneous three times a day before meals  insulin lispro (ADMELOG) corrective regimen sliding scale   SubCutaneous at bedtime    MEDICATIONS  (PRN):  HYDROmorphone  Injectable 0.5 milliGRAM(s) IV Push every 10 minutes PRN Moderate Pain (4 - 6)  ondansetron Injectable 4 milliGRAM(s) IV Push once PRN Nausea and/or Vomiting  oxyCODONE    IR 5 milliGRAM(s) Oral every 4 hours PRN Moderate Pain (4 - 6)      OBJECTIVE:    Vital Signs Last 24 Hrs  T(C): 36.4 (24 May 2022 00:00), Max: 37.1 (23 May 2022 09:16)  T(F): 97.5 (24 May 2022 00:00), Max: 98.8 (23 May 2022 09:16)  HR: 71 (24 May 2022 03:00) (53 - 82)  BP: 131/64 (24 May 2022 03:00) (126/77 - 155/77)  BP(mean): 89 (24 May 2022 03:00) (89 - 114)  RR: 14 (24 May 2022 03:00) (14 - 18)  SpO2: 95% (24 May 2022 03:00) (94% - 100%)    PHYSICAL EXAM:      Constitutional: NAD, A&O x 4  Respiratory: nonlabor breathing   Cardiovascular: RRR  Gastrointestinal: abd soft, ND/NT, port sites c/d/i. skin erythema at RUQ from previous dressing             I&O's Detail    23 May 2022 07:01  -  24 May 2022 03:13  --------------------------------------------------------  IN:    Oral Fluid: 480 mL  Total IN: 480 mL    OUT:    Voided (mL): 1025 mL  Total OUT: 1025 mL    Total NET: -545 mL          Daily Height in cm: 165.1 (23 May 2022 12:10)    Daily     LABS:                        12.9   13.02 )-----------( 253      ( 23 May 2022 19:00 )             38.0     05-23    139  |  101  |  15  ----------------------------<  171<H>  3.7   |  25  |  0.86    Ca    9.3      23 May 2022 19:00  Phos  3.2     05-23  Mg     1.9     05-23    TPro  7.3  /  Alb  4.3  /  TBili  0.5  /  DBili  x   /  AST  85<H>  /  ALT  71<H>  /  AlkPhos  54  05-23                       ACS DAILY PROGRESS NOTE:     SUBJECTIVE/ROS: No acute events overnight. Patient seen and examined this AM, reports feeling well. He denies nausea/vomiting.      MEDICATIONS  (STANDING):  allopurinol 300 milliGRAM(s) Oral daily  amLODIPine   Tablet 5 milliGRAM(s) Oral daily  atorvastatin 10 milliGRAM(s) Oral at bedtime  dextrose 5%. 1000 milliLiter(s) (100 mL/Hr) IV Continuous <Continuous>  glucagon  Injectable 1 milliGRAM(s) IntraMuscular once  heparin   Injectable 5000 Unit(s) SubCutaneous every 8 hours  hydrochlorothiazide 25 milliGRAM(s) Oral daily  hydrocortisone 1% Cream 1 Application(s) Topical two times a day  insulin lispro (ADMELOG) corrective regimen sliding scale   SubCutaneous three times a day before meals  insulin lispro (ADMELOG) corrective regimen sliding scale   SubCutaneous at bedtime    MEDICATIONS  (PRN):  HYDROmorphone  Injectable 0.5 milliGRAM(s) IV Push every 10 minutes PRN Moderate Pain (4 - 6)  ondansetron Injectable 4 milliGRAM(s) IV Push once PRN Nausea and/or Vomiting  oxyCODONE    IR 5 milliGRAM(s) Oral every 4 hours PRN Moderate Pain (4 - 6)      OBJECTIVE:  Vital Signs Last 24 Hrs  T(C): 36.4 (24 May 2022 00:00), Max: 37.1 (23 May 2022 09:16)  T(F): 97.5 (24 May 2022 00:00), Max: 98.8 (23 May 2022 09:16)  HR: 71 (24 May 2022 03:00) (53 - 82)  BP: 131/64 (24 May 2022 03:00) (126/77 - 155/77)  BP(mean): 89 (24 May 2022 03:00) (89 - 114)  RR: 14 (24 May 2022 03:00) (14 - 18)  SpO2: 95% (24 May 2022 03:00) (94% - 100%)    PHYSICAL EXAM:  Constitutional: NAD, A&O x 4  Respiratory: nonlabor breathing   Gastrointestinal: abd soft, ND/NT, port sites c/d/i. skin erythema at RUQ from previous dressing       I&O's Detail    23 May 2022 07:01  -  24 May 2022 03:13  --------------------------------------------------------  IN:    Oral Fluid: 480 mL  Total IN: 480 mL    OUT:    Voided (mL): 1025 mL  Total OUT: 1025 mL  Total NET: -545 mL      Daily Height in cm: 165.1 (23 May 2022 12:10)    Daily     LABS:                        12.9   13.02 )-----------( 253      ( 23 May 2022 19:00 )             38.0     05-23    139  |  101  |  15  ----------------------------<  171<H>  3.7   |  25  |  0.86    Ca    9.3      23 May 2022 19:00  Phos  3.2     05-23  Mg     1.9     05-23    TPro  7.3  /  Alb  4.3  /  TBili  0.5  /  DBili  x   /  AST  85<H>  /  ALT  71<H>  /  AlkPhos  54  05-23                       ACS DAILY PROGRESS NOTE:     SUBJECTIVE/ROS: No acute events overnight. Patient seen and examined this AM, reports feeling well. He denies nausea/vomiting. Tolerated diet well last night.      MEDICATIONS  (STANDING):  allopurinol 300 milliGRAM(s) Oral daily  amLODIPine   Tablet 5 milliGRAM(s) Oral daily  atorvastatin 10 milliGRAM(s) Oral at bedtime  dextrose 5%. 1000 milliLiter(s) (100 mL/Hr) IV Continuous <Continuous>  glucagon  Injectable 1 milliGRAM(s) IntraMuscular once  heparin   Injectable 5000 Unit(s) SubCutaneous every 8 hours  hydrochlorothiazide 25 milliGRAM(s) Oral daily  hydrocortisone 1% Cream 1 Application(s) Topical two times a day  insulin lispro (ADMELOG) corrective regimen sliding scale   SubCutaneous three times a day before meals  insulin lispro (ADMELOG) corrective regimen sliding scale   SubCutaneous at bedtime    MEDICATIONS  (PRN):  HYDROmorphone  Injectable 0.5 milliGRAM(s) IV Push every 10 minutes PRN Moderate Pain (4 - 6)  ondansetron Injectable 4 milliGRAM(s) IV Push once PRN Nausea and/or Vomiting  oxyCODONE    IR 5 milliGRAM(s) Oral every 4 hours PRN Moderate Pain (4 - 6)      OBJECTIVE:  Vital Signs Last 24 Hrs  T(C): 36.4 (24 May 2022 00:00), Max: 37.1 (23 May 2022 09:16)  T(F): 97.5 (24 May 2022 00:00), Max: 98.8 (23 May 2022 09:16)  HR: 71 (24 May 2022 03:00) (53 - 82)  BP: 131/64 (24 May 2022 03:00) (126/77 - 155/77)  BP(mean): 89 (24 May 2022 03:00) (89 - 114)  RR: 14 (24 May 2022 03:00) (14 - 18)  SpO2: 95% (24 May 2022 03:00) (94% - 100%)    PHYSICAL EXAM:  Constitutional: NAD, A&O x 4  Respiratory: nonlabor breathing   Gastrointestinal: abd soft, ND/NT, port sites c/d/i. skin erythema at RUQ from previous dressing       I&O's Detail    23 May 2022 07:01  -  24 May 2022 03:13  --------------------------------------------------------  IN:    Oral Fluid: 480 mL  Total IN: 480 mL    OUT:    Voided (mL): 1025 mL  Total OUT: 1025 mL  Total NET: -545 mL      Daily Height in cm: 165.1 (23 May 2022 12:10)    Daily     LABS:                        12.9   13.02 )-----------( 253      ( 23 May 2022 19:00 )             38.0     05-23    139  |  101  |  15  ----------------------------<  171<H>  3.7   |  25  |  0.86    Ca    9.3      23 May 2022 19:00  Phos  3.2     05-23  Mg     1.9     05-23    TPro  7.3  /  Alb  4.3  /  TBili  0.5  /  DBili  x   /  AST  85<H>  /  ALT  71<H>  /  AlkPhos  54  05-23

## 2022-06-02 LAB — SURGICAL PATHOLOGY STUDY: SIGNIFICANT CHANGE UP

## 2022-07-12 PROBLEM — G47.33 OBSTRUCTIVE SLEEP APNEA (ADULT) (PEDIATRIC): Chronic | Status: ACTIVE | Noted: 2022-03-04

## 2022-07-12 PROBLEM — K82.A2: Chronic | Status: ACTIVE | Noted: 2022-05-17

## 2022-07-18 ENCOUNTER — APPOINTMENT (OUTPATIENT)
Dept: TRAUMA SURGERY | Facility: CLINIC | Age: 47
End: 2022-07-18

## 2022-07-18 NOTE — PHYSICAL EXAM
[Normal Breath Sounds] : Normal breath sounds [Normal Heart Sounds] : normal heart sounds [No Rash or Lesion] : No rash or lesion [Alert] : alert [Calm] : calm [de-identified] : No acute distress [de-identified] : Soft, nontender, wounds C/D/I.

## 2022-07-18 NOTE — ASSESSMENT
[FreeTextEntry1] : 46 yo male s/p lap cholecystectomy.\par - Good postoperative course.\par - Pathology acute and chronic cholelithiasis, cholelithiasis.\par - Return to work form given. Given copies of pathology and operative report.\par - RTC PRN.

## 2022-07-18 NOTE — HISTORY OF PRESENT ILLNESS
[FreeTextEntry1] : 46 yo male s/p laparoscopic cholecystectomy and cholecystostomy tube removal. He refers feeling very inflamed immediately postop, now it has subsided and he is barely taking any pain medication. No N/V/D, no fever, chills, acholia, choluria or jaundice.

## 2022-12-28 NOTE — ED PROVIDER NOTE - NSICDXPASTSURGICALHX_GEN_ALL_CORE_FT
Lansoprazole 15 mg capsule, 30 day supply refill requested   3 pills left  [facility] [id number] [address] [facility] PAST SURGICAL HISTORY:  No significant past surgical history

## 2023-12-28 ENCOUNTER — NON-APPOINTMENT (OUTPATIENT)
Age: 48
End: 2023-12-28

## (undated) DEVICE — APPLICATOR VISTASEAL LAP DUAL 35CM RIGID

## (undated) DEVICE — POSITIONER FOAM EGG CRATE ULNAR 2PCS (PINK)

## (undated) DEVICE — DISSECTOR ENDO PEANUT 5MM

## (undated) DEVICE — SPECIMEN CONTAINER 100ML

## (undated) DEVICE — DRAPE INSTRUMENT POUCH 6.75" X 11"

## (undated) DEVICE — STOPCOCK 4-WAY W SWIVEL MALE LUER LOCK NON VENTED RED CAP

## (undated) DEVICE — WARMING BLANKET UPPER ADULT

## (undated) DEVICE — SOL IRR POUR NS 0.9% 500ML

## (undated) DEVICE — TUBING STRYKEFLOW II SUCTION / IRRIGATOR

## (undated) DEVICE — DRSG STERISTRIPS 0.5 X 4"

## (undated) DEVICE — ELCTR CORD FOOTSWITCH 1PLR LAPSCP 10FT

## (undated) DEVICE — SUT POLYSORB 0 60" TIES UNDYED

## (undated) DEVICE — MEDICATION LABELS W MARKER

## (undated) DEVICE — GLV 6.5 PROTEXIS (WHITE)

## (undated) DEVICE — SUT MONOCRYL 4-0 18" PS-2

## (undated) DEVICE — SUT PDS II 0 18" ENDOLOOP LIGATURE

## (undated) DEVICE — TROCAR COVIDIEN VERSAPORT BLADELESS OPTICAL 12MM STANDARD

## (undated) DEVICE — TUBING TRUWAVE PRESSURE MALE/FEMALE 72"

## (undated) DEVICE — DRAPE GENERAL ENDOSCOPY

## (undated) DEVICE — PREP CHLORAPREP HI-LITE ORANGE 26ML

## (undated) DEVICE — GLV 8.5 PROTEXIS (WHITE)

## (undated) DEVICE — GLV 8 PROTEXIS (WHITE)

## (undated) DEVICE — DRSG TELFA 3 X 8

## (undated) DEVICE — GOWN TRIMAX LG

## (undated) DEVICE — SHEARS COVIDIEN ENDO SHEAR 5MM X 31CM W UNIPOLAR CAUTERY

## (undated) DEVICE — MARKING PEN W RULER

## (undated) DEVICE — TROCAR COVIDIEN VERSAPORT BLADELESS OPTICAL 5MM STANDARD

## (undated) DEVICE — SOL IRR POUR H2O 250ML

## (undated) DEVICE — TUBING PLUME AWAY 4.0

## (undated) DEVICE — DRSG OPSITE 2.5 X 2"

## (undated) DEVICE — DRAPE TOWEL BLUE 17" X 24"

## (undated) DEVICE — INSUFFLATION NDL COVIDIEN STEP 14G FOR STEP/VERSASTEP

## (undated) DEVICE — VENODYNE/SCD SLEEVE CALF LARGE

## (undated) DEVICE — GLV 7 PROTEXIS (WHITE)

## (undated) DEVICE — BLADE SCALPEL SAFETYLOCK #11

## (undated) DEVICE — GLV 7.5 PROTEXIS (WHITE)

## (undated) DEVICE — ENDOCATCH 10MM SPECIMEN POUCH

## (undated) DEVICE — PACK LAP CHOLE LAP APPENDECTOMY